# Patient Record
Sex: FEMALE | Race: WHITE
[De-identification: names, ages, dates, MRNs, and addresses within clinical notes are randomized per-mention and may not be internally consistent; named-entity substitution may affect disease eponyms.]

---

## 2017-02-16 ENCOUNTER — HOSPITAL ENCOUNTER (EMERGENCY)
Age: 52
Discharge: HOME | End: 2017-02-16
Payer: COMMERCIAL

## 2017-02-16 DIAGNOSIS — Z79.82: ICD-10-CM

## 2017-02-16 DIAGNOSIS — I87.8: ICD-10-CM

## 2017-02-16 DIAGNOSIS — E11.65: ICD-10-CM

## 2017-02-16 DIAGNOSIS — Z79.4: ICD-10-CM

## 2017-02-16 DIAGNOSIS — F17.200: ICD-10-CM

## 2017-02-16 DIAGNOSIS — I11.0: Primary | ICD-10-CM

## 2017-02-16 DIAGNOSIS — J45.909: ICD-10-CM

## 2017-02-16 DIAGNOSIS — I89.0: ICD-10-CM

## 2017-02-16 DIAGNOSIS — I50.9: ICD-10-CM

## 2017-02-16 DIAGNOSIS — E11.42: ICD-10-CM

## 2017-02-16 PROCEDURE — 83735 ASSAY OF MAGNESIUM: CPT

## 2017-02-16 PROCEDURE — 96374 THER/PROPH/DIAG INJ IV PUSH: CPT

## 2017-02-16 PROCEDURE — 99283 EMERGENCY DEPT VISIT LOW MDM: CPT

## 2017-02-16 PROCEDURE — 85025 COMPLETE CBC W/AUTO DIFF WBC: CPT

## 2017-02-16 PROCEDURE — 96375 TX/PRO/DX INJ NEW DRUG ADDON: CPT

## 2017-02-16 PROCEDURE — 83880 ASSAY OF NATRIURETIC PEPTIDE: CPT

## 2017-02-16 PROCEDURE — 96376 TX/PRO/DX INJ SAME DRUG ADON: CPT

## 2017-02-16 PROCEDURE — 99284 EMERGENCY DEPT VISIT MOD MDM: CPT

## 2017-02-16 PROCEDURE — 93005 ELECTROCARDIOGRAM TRACING: CPT

## 2017-02-16 PROCEDURE — 36415 COLL VENOUS BLD VENIPUNCTURE: CPT

## 2017-02-16 PROCEDURE — 80053 COMPREHEN METABOLIC PANEL: CPT

## 2017-02-16 PROCEDURE — 71020: CPT

## 2017-02-16 PROCEDURE — 83690 ASSAY OF LIPASE: CPT

## 2017-04-10 ENCOUNTER — HOSPITAL ENCOUNTER (OUTPATIENT)
Age: 52
End: 2017-04-10
Payer: COMMERCIAL

## 2017-04-10 DIAGNOSIS — Z03.89: Primary | ICD-10-CM

## 2017-04-11 ENCOUNTER — HOSPITAL ENCOUNTER (EMERGENCY)
Age: 52
Discharge: ANOTHER HOSPITAL | End: 2017-04-11
Payer: COMMERCIAL

## 2017-04-11 ENCOUNTER — HOSPITAL ENCOUNTER (OUTPATIENT)
Age: 52
Discharge: ANOTHER HOSPITAL | End: 2017-04-11
Payer: COMMERCIAL

## 2017-04-11 ENCOUNTER — HOSPITAL ENCOUNTER (OUTPATIENT)
Age: 52
Discharge: ANOTHER HOSPITAL | End: 2017-04-11
Payer: COMMERCIAL

## 2017-04-11 DIAGNOSIS — J45.909: ICD-10-CM

## 2017-04-11 DIAGNOSIS — Z79.4: ICD-10-CM

## 2017-04-11 DIAGNOSIS — M72.6: Primary | ICD-10-CM

## 2017-04-11 DIAGNOSIS — E11.42: ICD-10-CM

## 2017-04-11 DIAGNOSIS — E78.00: ICD-10-CM

## 2017-04-11 DIAGNOSIS — L98.9: Primary | ICD-10-CM

## 2017-04-11 DIAGNOSIS — I10: ICD-10-CM

## 2017-04-11 DIAGNOSIS — F17.200: ICD-10-CM

## 2017-04-11 DIAGNOSIS — K21.9: ICD-10-CM

## 2017-04-11 PROCEDURE — 83605 ASSAY OF LACTIC ACID: CPT

## 2017-04-11 PROCEDURE — 80053 COMPREHEN METABOLIC PANEL: CPT

## 2017-04-11 PROCEDURE — 87040 BLOOD CULTURE FOR BACTERIA: CPT

## 2017-04-11 PROCEDURE — 83690 ASSAY OF LIPASE: CPT

## 2017-04-11 PROCEDURE — 99291 CRITICAL CARE FIRST HOUR: CPT

## 2017-04-11 PROCEDURE — 36415 COLL VENOUS BLD VENIPUNCTURE: CPT

## 2017-04-11 PROCEDURE — 85025 COMPLETE CBC W/AUTO DIFF WBC: CPT

## 2017-04-11 PROCEDURE — 96365 THER/PROPH/DIAG IV INF INIT: CPT

## 2017-04-11 PROCEDURE — 99284 EMERGENCY DEPT VISIT MOD MDM: CPT

## 2017-04-11 PROCEDURE — 72193 CT PELVIS W/DYE: CPT

## 2017-05-26 ENCOUNTER — HOSPITAL ENCOUNTER (OUTPATIENT)
Dept: HOSPITAL 76 - EMS | Age: 52
Discharge: ANOTHER HOSPITAL | End: 2017-05-26
Attending: SURGERY
Payer: COMMERCIAL

## 2017-05-26 ENCOUNTER — HOSPITAL ENCOUNTER (OUTPATIENT)
Dept: HOSPITAL 76 - EMS | Age: 52
Discharge: HOME | End: 2017-05-26
Attending: SURGERY
Payer: COMMERCIAL

## 2017-05-26 ENCOUNTER — HOSPITAL ENCOUNTER (EMERGENCY)
Dept: HOSPITAL 76 - ED | Age: 52
Discharge: HOME | End: 2017-05-26
Payer: COMMERCIAL

## 2017-05-26 VITALS — DIASTOLIC BLOOD PRESSURE: 84 MMHG | SYSTOLIC BLOOD PRESSURE: 167 MMHG

## 2017-05-26 DIAGNOSIS — E66.01: ICD-10-CM

## 2017-05-26 DIAGNOSIS — M79.605: Primary | ICD-10-CM

## 2017-05-26 DIAGNOSIS — Z79.82: ICD-10-CM

## 2017-05-26 DIAGNOSIS — Z98.890: ICD-10-CM

## 2017-05-26 DIAGNOSIS — Z91.19: ICD-10-CM

## 2017-05-26 DIAGNOSIS — R53.1: Primary | ICD-10-CM

## 2017-05-26 DIAGNOSIS — E11.42: ICD-10-CM

## 2017-05-26 DIAGNOSIS — M72.6: ICD-10-CM

## 2017-05-26 DIAGNOSIS — F17.200: ICD-10-CM

## 2017-05-26 DIAGNOSIS — I10: ICD-10-CM

## 2017-05-26 DIAGNOSIS — Z79.4: ICD-10-CM

## 2017-05-26 DIAGNOSIS — M79.89: ICD-10-CM

## 2017-05-26 LAB
ALBUMIN/GLOB SERPL: 0.5 {RATIO} (ref 1–2.2)
ANION GAP SERPL CALCULATED.4IONS-SCNC: 7 MMOL/L (ref 6–13)
BASOPHILS NFR BLD AUTO: 0.2 10^3/UL (ref 0–0.1)
BASOPHILS NFR BLD AUTO: 2.6 %
BILIRUB BLD-MCNC: 0.6 MG/DL (ref 0.2–1)
BUN SERPL-MCNC: 19 MG/DL (ref 6–20)
CALCIUM UR-MCNC: 8.8 MG/DL (ref 8.5–10.3)
CHLORIDE SERPL-SCNC: 102 MMOL/L (ref 101–111)
CO2 SERPL-SCNC: 30 MMOL/L (ref 21–32)
CREAT SERPLBLD-SCNC: 0.8 MG/DL (ref 0.4–1)
CUL URINE ADD CHARGE: (no result)
EOSINOPHIL # BLD AUTO: 0.3 10^3/UL (ref 0–0.7)
EOSINOPHIL NFR BLD AUTO: 5 %
ERYTHROCYTE [DISTWIDTH] IN BLOOD BY AUTOMATED COUNT: 17.7 % (ref 12–15)
GFRSERPLBLD MDRD-ARVRAT: 76 ML/MIN/{1.73_M2} (ref 89–?)
GLOBULIN SER-MCNC: 4 G/DL (ref 2.1–4.2)
GLUCOSE SERPL-MCNC: 168 MG/DL (ref 70–100)
HCT VFR BLD AUTO: 28.1 % (ref 37–47)
HGB UR QL STRIP: 9 G/DL (ref 12–16)
LIPASE SERPL-CCNC: 19 U/L (ref 22–51)
LYMPHOCYTES # SPEC AUTO: 1.4 10^3/UL (ref 1.5–3.5)
LYMPHOCYTES NFR BLD AUTO: 20.7 %
MCH RBC QN AUTO: 22.9 PG (ref 27–31)
MCHC RBC AUTO-ENTMCNC: 31.9 G/DL (ref 32–36)
MCV RBC AUTO: 71.7 FL (ref 81–99)
MONOCYTES # BLD AUTO: 0.6 10^3/UL (ref 0–1)
MONOCYTES NFR BLD AUTO: 9.2 %
NEUTROPHILS # BLD AUTO: 4.3 10^3/UL (ref 1.5–6.6)
NEUTROPHILS # SNV AUTO: 6.9 X10^3/UL (ref 4.8–10.8)
NEUTROPHILS NFR BLD AUTO: 62.5 %
NRBC # BLD AUTO: 0 /100WBC
PDW BLD AUTO: 8.7 FL (ref 7.9–10.8)
PH UR STRIP.AUTO: 7 PH (ref 5–7.5)
POTASSIUM SERPL-SCNC: 3.6 MMOL/L (ref 3.5–5)
PROT SPEC-MCNC: 6 G/DL (ref 6.7–8.2)
RBC MAR: 3.92 10^6/UL (ref 4.2–5.4)
SODIUM SERPLBLD-SCNC: 139 MMOL/L (ref 135–145)
SP GR UR STRIP.AUTO: 1.02 (ref 1–1.03)
UA CHARGE (STRIP ONLY): (no result)
UA W/ MICROSCOPIC CHARGE: YES
UR CULTURE IF IND: (no result)
UROBILINOGEN UR STRIP.AUTO-MCNC: NEGATIVE MG/DL
WBC # BLD: 6.9 X10^3/UL
WBC # UR MANUAL: (no result) /HPF (ref 0–5)

## 2017-05-26 PROCEDURE — 85025 COMPLETE CBC W/AUTO DIFF WBC: CPT

## 2017-05-26 PROCEDURE — 99284 EMERGENCY DEPT VISIT MOD MDM: CPT

## 2017-05-26 PROCEDURE — 83690 ASSAY OF LIPASE: CPT

## 2017-05-26 PROCEDURE — 80053 COMPREHEN METABOLIC PANEL: CPT

## 2017-05-26 PROCEDURE — 87086 URINE CULTURE/COLONY COUNT: CPT

## 2017-05-26 PROCEDURE — 81003 URINALYSIS AUTO W/O SCOPE: CPT

## 2017-05-26 PROCEDURE — 81001 URINALYSIS AUTO W/SCOPE: CPT

## 2017-05-26 PROCEDURE — 36415 COLL VENOUS BLD VENIPUNCTURE: CPT

## 2017-05-26 NOTE — ED PHYSICIAN DOCUMENTATION
History of Present Illness





- Stated complaint


Stated Complaint: WEAKNESS, POST OP SURGICAL PAIN





- Chief complaint


Chief Complaint: Wound





- History obtained from


History obtained from: Patient, Family, EMS





- History of Present Illness


Timing: How many weeks ago (6)





- Additonal information


Additional information: 





51 y/o morbidly obese female with a history of type 2 diabetes had nec fasc in 

her right groin on 4-11-17, was transferred to [facility] had debridement done and has 

been recovering at [facility] until she left AMA 2 days ago. She has been confined for 

the past 6 weeks, she has lost the ability to walk and was undergoing physical 

therapy when she became frustrated specifically with the physical therapist and 

left the hospital AMA. She was assisted to the car by passers by and she was 

able to get out of the car at home and made it to the front porch where her 

 was able to get a sleeping bag under her and drag her into the house. 

She has been on the floor on a memory foam since and she has not been able to 

get up. She has weakness but no fever and her wound is continuing to heal by 

secondary intention and her  is able to dress the wounds. Her diabetes 

has been controlled in the hospital and is much improved from her previous with 

A1C of 14. Today her blood sugar is 155 on arrival.  





Review of Systems


Constitutional: denies: Fever


Eyes: denies: Decreased vision


Ears: denies: Ear pain


Nose: denies: Congestion


Throat: denies: Oral lesions / sores


Cardiac: denies: Chest pain / pressure, Palpitations


Respiratory: denies: Dyspnea, Cough


GI: denies: Abdominal Pain, Nausea, Vomiting


: denies: Dysuria, Frequency


Skin: reports: Other (There is a deep wound healing on the right groin.)


Musculoskeletal: reports: Extremity pain


Neurologic: reports: Generalized weakness.  denies: Focal weakness, Numbness





PD PAST MEDICAL HISTORY





- Past Medical History


Cardiovascular: Hypertension, High cholesterol


Respiratory: Asthma


Neuro: Headache/migraine, Peripheral neuropathy


Endocrine/Autoimmune: Type 2 diabetes


GI: GERD


GYN: None


: None


HEENT: None


Psych: None, Anxiety


Musculoskeletal: None


Derm: None





- Past Surgical History


Past Surgical History: Yes


General: Appendectomy





- Present Medications


Home Medications: 


 Ambulatory Orders











 Medication  Instructions  Recorded  Confirmed


 


Esomeprazole Magnesium [Nexium] 40 mg PO BID 03/12/15 04/11/17


 


Insulin Aspart (Vial) [NovoLOG] 10 units SQ TIDACHS 03/12/15 04/11/17


 


Insulin Glargine,Hum.rec.anlog 50 units SQ BID 03/12/15 04/11/17





[Lantus]   


 


Lisinopril 10 mg PO DAILY 03/12/15 04/11/17


 


Simvastatin 20 mg PO DAILY 03/12/15 04/11/17


 


Aspirin [Aspir 81] 81 mg PO DAILY 12/31/15 04/11/17


 


Metoprolol Succinate [Toprol Xl] 25 mg PO DAILY 12/31/15 04/11/17


 


Potassium Chloride [Klor-Con 10] 20 meq PO DAILY 01/14/16 04/11/17


 


Chlorhexidine Gluconate [Hibiclens] 1 applic TP DAILY #238 ml 02/16/17 04/11/17


 


Fluticasone/Salmeterol 250/50 1 puffs INH BID #1 inhaler 02/16/17 04/11/17





[Advair 250 Mcg/50 Mcg]   


 


Ipratropium/Albuterol [Combivent 1 puffs IH BID #1 aerosol 02/16/17 04/11/17





Respimat]   


 


Nortriptyline HCl 75 mg PO DAILY #30 capsule 02/16/17 04/11/17


 


Urea [Gormel Ten] 1 applic TP BID #228 g 02/16/17 04/11/17


 


Furosemide [Lasix] 60 mg PO DAILY 04/11/17 04/11/17














- Allergies


Allergies/Adverse Reactions: 


 Allergies











Allergy/AdvReac Type Severity Reaction Status Date / Time


 


metformin AdvReac  Nausea Verified 05/26/17 06:35


 


oxycodone HCl * AdvReac  Unknown Verified 05/26/17 06:35





[From Percocet]     














- Social History


Does the pt smoke?: Yes


Smoking Status: Current every day smoker


Does the pt drink ETOH?: Yes


Does the pt have substance abuse?: No





- Immunizations


Immunizations are current?: Yes





- POLST


Patient has POLST: No





PD ED PE NORMAL





- Vitals


Vital signs reviewed: Yes (hypertension )





- General


General: Alert and oriented X 3, No acute distress, Well developed/nourished





- HEENT


HEENT: Atraumatic, PERRL, EOMI





- Neck


Neck: Supple, no meningeal sign





- Cardiac


Cardiac: RRR, No murmur





- Respiratory


Respiratory: No respiratory distress, Clear bilaterally





- Abdomen


Abdomen: Other (morbidly obese without tenderness)





- Back


Back: No CVA TTP, No spinal TTP





- Derm


Derm: Normal color, Warm and dry





- Extremities


Extremities: Other (There is pitting edema to the LE bilaterally with some 

superficial erythema to the knee. There is an area about 30X 30 in the right 

groin that is healing by secondary intention. There is surface drainage from 

the entire surface that is red, no specific smell. The  notes that the 

wound looks similar to prior. )





- Neuro


Neuro: No motor deficit, No sensory deficit





- Psych


Psych: Other (mood is helpless and the affect is flat. )





Results





- Vitals


Vitals: 


 Vital Signs - 24 hr











  05/26/17 05/26/17 05/26/17





  06:25 06:56 10:45


 


Temperature 36.8 C  36.7 C


 


Heart Rate 97 103 H 111 H


 


Respiratory 18 18 18





Rate   


 


Blood Pressure 191/101 H 130/80 162/89 H


 


O2 Saturation 98 99 100














  05/26/17





  13:31


 


Temperature 


 


Heart Rate 90


 


Respiratory 19





Rate 


 


Blood Pressure 167/84 H


 


O2 Saturation 94








 Oxygen











O2 Source                      Room air

















- Labs


Labs: 


 Laboratory Tests











  05/26/17 05/26/17 05/26/17





  08:29 08:29 11:35


 


WBC  6.9  


 


RBC  3.92 L  


 


Hgb  9.0 L  


 


Hct  28.1 L  


 


MCV  71.7 L  


 


MCH  22.9 L  


 


MCHC  31.9 L  


 


RDW  17.7 H  


 


Plt Count  290  


 


MPV  8.7  


 


Neut #  4.3  


 


Lymph #  1.4 L  


 


Mono #  0.6  


 


Eos #  0.3  


 


Baso #  0.2 H  


 


Absolute Nucleated RBC  0.00  


 


Nucleated RBCs  0.0  


 


Sodium   139 


 


Potassium   3.6 


 


Chloride   102 


 


Carbon Dioxide   30 


 


Anion Gap   7.0 


 


BUN   19 


 


Creatinine   0.8 


 


Estimated GFR (MDRD)   76 L 


 


Glucose   168 H 


 


Calcium   8.8 


 


Total Bilirubin   0.6 


 


AST   14 


 


ALT   14 


 


Alkaline Phosphatase   83 


 


Total Protein   6.0 L 


 


Albumin   2.0 L 


 


Globulin   4.0 


 


Albumin/Globulin Ratio   0.5 L 


 


Lipase   19 L 


 


Urine Color    YELLOW


 


Urine Clarity    CLEAR


 


Urine pH    7.0


 


Ur Specific Gravity    1.020


 


Urine Protein    >=300 H


 


Urine Glucose (UA)    100 H


 


Urine Ketones    NEGATIVE


 


Urine Occult Blood    SMALL H


 


Urine Nitrite    NEGATIVE


 


Urine Bilirubin    NEGATIVE


 


Urine Urobilinogen    0.2 (NORMAL)


 


Ur Leukocyte Esterase    NEGATIVE


 


Urine RBC    0-5


 


Urine WBC    0-3


 


Ur Squamous Epith Cells    MOD Squamous H


 


Urine Bacteria    None Seen


 


Ur Microscopic Review    INDICATED


 


Urine Culture Comments    NOT INDICATED














PD MEDICAL DECISION MAKING





- ED course


Complexity details: reviewed old records, reviewed results, re-evaluated patient

, considered differential, d/w patient, d/w family


ED course: 





51 y/o morbidly obese female has survived necrotizing fasciitis and has severe 

deconditioning and is not able to walk and care for herself as yet. She has 

ongoing needs for wound care which the  has been providing and she has 

needs for assistance with ambulation. She has left another hospital AMA and my 

initial impression is that she would need extended rehab to regain her ability 

to walk. We were not able to identify a facility for this bariatric patient and 

she was invested in returning to her home. Social serviced helped with some 

arrangements and she was transferred back to her home by ambulance with the 

fire department called to help. The family will try to obtain the needed 

medical bed and a lift and the patient's diabetes is under much improved 

control. 





Departure





- Departure


Disposition: 01 Home, Self Care


Clinical Impression: 


 Healing wound, Severe muscle deconditioning


Condition: Stable


Instructions:  ED Packing Removal Replacement


Follow-Up: 


Lindsay Sosa PA-C [Primary Care Provider] - 


Discharge Date/Time: 05/26/17 14:32

## 2017-05-31 ENCOUNTER — HOSPITAL ENCOUNTER (OUTPATIENT)
Dept: HOSPITAL 76 - EMS | Age: 52
Discharge: ANOTHER HOSPITAL | End: 2017-05-31
Attending: SURGERY
Payer: COMMERCIAL

## 2017-05-31 ENCOUNTER — HOSPITAL ENCOUNTER (INPATIENT)
Age: 52
LOS: 14 days | Discharge: NURSING, REHAB OR LONG-TERM CARE | DRG: 555 | End: 2017-06-14
Payer: COMMERCIAL

## 2017-05-31 DIAGNOSIS — L03.314: ICD-10-CM

## 2017-05-31 DIAGNOSIS — E11.65: ICD-10-CM

## 2017-05-31 DIAGNOSIS — B95.2: ICD-10-CM

## 2017-05-31 DIAGNOSIS — Z79.82: ICD-10-CM

## 2017-05-31 DIAGNOSIS — E78.5: ICD-10-CM

## 2017-05-31 DIAGNOSIS — B95.8: ICD-10-CM

## 2017-05-31 DIAGNOSIS — E11.42: ICD-10-CM

## 2017-05-31 DIAGNOSIS — M72.6: ICD-10-CM

## 2017-05-31 DIAGNOSIS — T14.8: Primary | ICD-10-CM

## 2017-05-31 DIAGNOSIS — K21.9: ICD-10-CM

## 2017-05-31 DIAGNOSIS — R26.2: Primary | ICD-10-CM

## 2017-05-31 DIAGNOSIS — Z98.890: ICD-10-CM

## 2017-05-31 DIAGNOSIS — F17.210: ICD-10-CM

## 2017-05-31 DIAGNOSIS — Z91.11: ICD-10-CM

## 2017-05-31 DIAGNOSIS — I10: ICD-10-CM

## 2017-05-31 DIAGNOSIS — R53.81: ICD-10-CM

## 2017-05-31 DIAGNOSIS — E66.01: ICD-10-CM

## 2017-05-31 DIAGNOSIS — G89.29: ICD-10-CM

## 2017-05-31 DIAGNOSIS — J45.909: ICD-10-CM

## 2017-05-31 DIAGNOSIS — Z74.01: ICD-10-CM
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
## 2017-05-31 DIAGNOSIS — Z60.2: ICD-10-CM

## 2017-05-31 DIAGNOSIS — F41.9: ICD-10-CM

## 2017-05-31 DIAGNOSIS — Z91.19: ICD-10-CM

## 2017-05-31 DIAGNOSIS — Z79.4: ICD-10-CM

## 2017-05-31 SDOH — SOCIAL STABILITY - SOCIAL INSECURITY: PROBLEMS RELATED TO LIVING ALONE: Z60.2

## 2017-06-14 ENCOUNTER — HOSPITAL ENCOUNTER (OUTPATIENT)
Dept: HOSPITAL 76 - EMS | Age: 52
Discharge: NURSING, REHAB OR LONG-TERM CARE | End: 2017-06-14
Attending: SURGERY
Payer: COMMERCIAL

## 2017-06-14 DIAGNOSIS — E66.01: Primary | ICD-10-CM

## 2017-06-24 ENCOUNTER — HOSPITAL ENCOUNTER (OUTPATIENT)
Dept: HOSPITAL 76 - EMS | Age: 52
End: 2017-06-24
Attending: SURGERY
Payer: COMMERCIAL

## 2017-06-24 DIAGNOSIS — R52: Primary | ICD-10-CM

## 2017-06-26 ENCOUNTER — HOSPITAL ENCOUNTER (OUTPATIENT)
Dept: HOSPITAL 76 - EMS | Age: 52
End: 2017-06-26
Attending: SURGERY
Payer: COMMERCIAL

## 2017-06-26 DIAGNOSIS — R53.1: Primary | ICD-10-CM

## 2017-07-05 ENCOUNTER — HOSPITAL ENCOUNTER (OUTPATIENT)
Age: 52
End: 2017-07-05
Payer: COMMERCIAL
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
## 2017-07-05 DIAGNOSIS — Z03.89: Primary | ICD-10-CM

## 2017-08-08 ENCOUNTER — HOSPITAL ENCOUNTER (OUTPATIENT)
Dept: HOSPITAL 76 - LAB.R | Age: 52
End: 2017-08-08
Attending: PHYSICIAN ASSISTANT
Payer: COMMERCIAL

## 2017-08-08 DIAGNOSIS — L03.115: Primary | ICD-10-CM

## 2017-08-08 PROCEDURE — 87070 CULTURE OTHR SPECIMN AEROBIC: CPT

## 2017-08-08 PROCEDURE — 87205 SMEAR GRAM STAIN: CPT

## 2018-01-01 ENCOUNTER — HOSPITAL ENCOUNTER (OUTPATIENT)
Dept: HOSPITAL 76 - EMS | Age: 53
Discharge: ANOTHER HOSPITAL | End: 2018-01-01
Attending: SURGERY
Payer: COMMERCIAL

## 2018-01-01 ENCOUNTER — HOSPITAL ENCOUNTER (INPATIENT)
Dept: HOSPITAL 76 - ED | Age: 53
LOS: 4 days | Discharge: ANOTHER HOSPITAL | DRG: 314 | End: 2018-01-05
Attending: INTERNAL MEDICINE | Admitting: INTERNAL MEDICINE
Payer: COMMERCIAL

## 2018-01-01 DIAGNOSIS — E66.2: ICD-10-CM

## 2018-01-01 DIAGNOSIS — F41.9: ICD-10-CM

## 2018-01-01 DIAGNOSIS — E78.5: ICD-10-CM

## 2018-01-01 DIAGNOSIS — Z79.82: ICD-10-CM

## 2018-01-01 DIAGNOSIS — Z91.19: ICD-10-CM

## 2018-01-01 DIAGNOSIS — G89.29: ICD-10-CM

## 2018-01-01 DIAGNOSIS — E11.42: ICD-10-CM

## 2018-01-01 DIAGNOSIS — I27.23: Primary | ICD-10-CM

## 2018-01-01 DIAGNOSIS — R35.1: ICD-10-CM

## 2018-01-01 DIAGNOSIS — R30.0: ICD-10-CM

## 2018-01-01 DIAGNOSIS — I13.0: ICD-10-CM

## 2018-01-01 DIAGNOSIS — Z79.51: ICD-10-CM

## 2018-01-01 DIAGNOSIS — R35.0: ICD-10-CM

## 2018-01-01 DIAGNOSIS — N18.9: ICD-10-CM

## 2018-01-01 DIAGNOSIS — R06.00: Primary | ICD-10-CM

## 2018-01-01 DIAGNOSIS — F17.200: ICD-10-CM

## 2018-01-01 DIAGNOSIS — R39.15: ICD-10-CM

## 2018-01-01 DIAGNOSIS — E11.649: ICD-10-CM

## 2018-01-01 DIAGNOSIS — J80: ICD-10-CM

## 2018-01-01 DIAGNOSIS — E11.22: ICD-10-CM

## 2018-01-01 DIAGNOSIS — N17.9: ICD-10-CM

## 2018-01-01 DIAGNOSIS — R32: ICD-10-CM

## 2018-01-01 DIAGNOSIS — Z79.899: ICD-10-CM

## 2018-01-01 DIAGNOSIS — I26.99: ICD-10-CM

## 2018-01-01 DIAGNOSIS — K21.9: ICD-10-CM

## 2018-01-01 DIAGNOSIS — G43.909: ICD-10-CM

## 2018-01-01 DIAGNOSIS — J44.9: ICD-10-CM

## 2018-01-01 DIAGNOSIS — Z86.19: ICD-10-CM

## 2018-01-01 DIAGNOSIS — G47.33: ICD-10-CM

## 2018-01-01 DIAGNOSIS — J18.1: ICD-10-CM

## 2018-01-01 DIAGNOSIS — E43: ICD-10-CM

## 2018-01-01 DIAGNOSIS — M54.9: ICD-10-CM

## 2018-01-01 DIAGNOSIS — I50.30: ICD-10-CM

## 2018-01-01 LAB
ANION GAP SERPL CALCULATED.4IONS-SCNC: 5 MMOL/L (ref 6–13)
BASOPHILS NFR BLD AUTO: 0.1 10^3/UL (ref 0–0.1)
BASOPHILS NFR BLD AUTO: 1.3 %
BUN SERPL-MCNC: 23 MG/DL (ref 6–20)
CALCIUM UR-MCNC: 8.7 MG/DL (ref 8.5–10.3)
CASTS URNS QL MICRO: (no result) /LPF
CHLORIDE SERPL-SCNC: 107 MMOL/L (ref 101–111)
CLARITY UR REFRACT.AUTO: (no result)
CO2 SERPL-SCNC: 29 MMOL/L (ref 21–32)
CREAT SERPLBLD-SCNC: 1.5 MG/DL (ref 0.4–1)
EOSINOPHIL # BLD AUTO: 0.2 10^3/UL (ref 0–0.7)
EOSINOPHIL NFR BLD AUTO: 3.3 %
ERYTHROCYTE [DISTWIDTH] IN BLOOD BY AUTOMATED COUNT: 19.6 % (ref 12–15)
GFRSERPLBLD MDRD-ARVRAT: 37 ML/MIN/{1.73_M2} (ref 89–?)
GLUCOSE SERPL-MCNC: 203 MG/DL (ref 70–100)
GLUCOSE UR QL STRIP.AUTO: 250 MG/DL
HGB UR QL STRIP: 12.6 G/DL (ref 12–16)
INFLUENZA A & B AG INTERPRET: (no result)
KETONES UR QL STRIP.AUTO: NEGATIVE MG/DL
LYMPHOCYTES # SPEC AUTO: 1.4 10^3/UL (ref 1.5–3.5)
LYMPHOCYTES NFR BLD AUTO: 20.4 %
MCH RBC QN AUTO: 23.4 PG (ref 27–31)
MCHC RBC AUTO-ENTMCNC: 30.6 G/DL (ref 32–36)
MCV RBC AUTO: 76.4 FL (ref 81–99)
MONOCYTES # BLD AUTO: 0.5 10^3/UL (ref 0–1)
MONOCYTES NFR BLD AUTO: 7.1 %
NEUTROPHILS # BLD AUTO: 4.7 10^3/UL (ref 1.5–6.6)
NEUTROPHILS # SNV AUTO: 6.9 X10^3/UL (ref 4.8–10.8)
NEUTROPHILS NFR BLD AUTO: 67.9 %
NITRITE UR QL STRIP.AUTO: NEGATIVE
PDW BLD AUTO: 10 FL (ref 7.9–10.8)
PH UR STRIP.AUTO: 6 PH (ref 5–7.5)
PLATELET # BLD: 148 10^3/UL (ref 130–450)
PROT UR STRIP.AUTO-MCNC: >=300 MG/DL
RBC # UR STRIP.AUTO: (no result) /UL
RBC # URNS HPF: (no result) /HPF (ref 0–5)
RBC MAR: 5.39 10^6/UL (ref 4.2–5.4)
SODIUM SERPLBLD-SCNC: 141 MMOL/L (ref 135–145)
SP GR UR STRIP.AUTO: 1.02 (ref 1–1.03)
SQUAMOUS URNS QL MICRO: (no result)
UROBILINOGEN UR QL STRIP.AUTO: (no result) E.U./DL
UROBILINOGEN UR STRIP.AUTO-MCNC: NEGATIVE MG/DL

## 2018-01-01 PROCEDURE — 93005 ELECTROCARDIOGRAM TRACING: CPT

## 2018-01-01 PROCEDURE — 99284 EMERGENCY DEPT VISIT MOD MDM: CPT

## 2018-01-01 PROCEDURE — 94640 AIRWAY INHALATION TREATMENT: CPT

## 2018-01-01 PROCEDURE — 83735 ASSAY OF MAGNESIUM: CPT

## 2018-01-01 PROCEDURE — 81001 URINALYSIS AUTO W/SCOPE: CPT

## 2018-01-01 PROCEDURE — 82803 BLOOD GASES ANY COMBINATION: CPT

## 2018-01-01 PROCEDURE — 94770: CPT

## 2018-01-01 PROCEDURE — 87275 INFLUENZA B AG IF: CPT

## 2018-01-01 PROCEDURE — 84484 ASSAY OF TROPONIN QUANT: CPT

## 2018-01-01 PROCEDURE — 36600 WITHDRAWAL OF ARTERIAL BLOOD: CPT

## 2018-01-01 PROCEDURE — 71045 X-RAY EXAM CHEST 1 VIEW: CPT

## 2018-01-01 PROCEDURE — 84100 ASSAY OF PHOSPHORUS: CPT

## 2018-01-01 PROCEDURE — 87086 URINE CULTURE/COLONY COUNT: CPT

## 2018-01-01 PROCEDURE — 93306 TTE W/DOPPLER COMPLETE: CPT

## 2018-01-01 PROCEDURE — 81003 URINALYSIS AUTO W/O SCOPE: CPT

## 2018-01-01 PROCEDURE — 96374 THER/PROPH/DIAG INJ IV PUSH: CPT

## 2018-01-01 PROCEDURE — 83605 ASSAY OF LACTIC ACID: CPT

## 2018-01-01 PROCEDURE — 83036 HEMOGLOBIN GLYCOSYLATED A1C: CPT

## 2018-01-01 PROCEDURE — 96372 THER/PROPH/DIAG INJ SC/IM: CPT

## 2018-01-01 PROCEDURE — 51702 INSERT TEMP BLADDER CATH: CPT

## 2018-01-01 PROCEDURE — 87040 BLOOD CULTURE FOR BACTERIA: CPT

## 2018-01-01 PROCEDURE — 83880 ASSAY OF NATRIURETIC PEPTIDE: CPT

## 2018-01-01 PROCEDURE — 36415 COLL VENOUS BLD VENIPUNCTURE: CPT

## 2018-01-01 PROCEDURE — 94660 CPAP INITIATION&MGMT: CPT

## 2018-01-01 PROCEDURE — 85025 COMPLETE CBC W/AUTO DIFF WBC: CPT

## 2018-01-01 PROCEDURE — 80053 COMPREHEN METABOLIC PANEL: CPT

## 2018-01-01 PROCEDURE — 94002 VENT MGMT INPAT INIT DAY: CPT

## 2018-01-01 PROCEDURE — 87276 INFLUENZA A AG IF: CPT

## 2018-01-01 PROCEDURE — 80048 BASIC METABOLIC PNL TOTAL CA: CPT

## 2018-01-01 RX ADMIN — PREGABALIN SCH MG: 100 CAPSULE ORAL at 21:33

## 2018-01-01 RX ADMIN — FUROSEMIDE SCH MG: 10 INJECTION, SOLUTION INTRAVENOUS at 21:32

## 2018-01-01 RX ADMIN — SODIUM CHLORIDE, PRESERVATIVE FREE SCH ML: 5 INJECTION INTRAVENOUS at 21:34

## 2018-01-01 RX ADMIN — NORTRIPTYLINE HYDROCHLORIDE SCH MG: 25 CAPSULE ORAL at 21:32

## 2018-01-01 RX ADMIN — ENOXAPARIN SODIUM SCH: 100 INJECTION SUBCUTANEOUS at 22:14

## 2018-01-01 NOTE — HISTORY & PHYSICAL EXAMINATION
Chief Complaint





- Chief Complaint


Chief Complaint: cough





Respiratory Admission HPI





- Admitted From


Admitted from: ED





- History Obtained From


Records Reviewed: RN notes reviewed, Old records reviewed


History obtained from: Patient, Family


Exam limitations: Clinical condition (unable to turn patient, but bariatric bed 

ordered to accommodate patient body habitous.)





- History of Present Illness


Location Problem/Description: Anasarca


Severity at the worst: reports: Severe


Context of Onset: reports: Exertion, Movement, Position


Timing: reports: Gradual onset


Improved with: reports: Nothing


Worsened by: reports: Exertion, Movement


Associated symptoms: reports: General weakness, Other


HPI Comment/Other: 


Fanny Flores is a morbidly obese 52-year old white female with a history of 

super obesity with a current BMI of 76.7, diabetes mellitus type 2, necrotizing 

fascitis to inner thigh, hypertension, hyperlipidemia, migraines, peripheral 

neuropathy, GERD, anxiety, chronic back pain, cardiac stress test, and CHF. The 

patient came to the ED with progressive shortness of breath, increased edema, 

headaches and weakness.  She admits to having bronchitis about a week ago  Once 

arriving in the ED the patient was found to be very hypoxic with oxygen 

saturations in the 80's.  Patient will be admitted for aggressive diuresis, 

dugan catheter for skin protection, blood sugar control and further work up.





PMH/PSH





- Past Medical History


Cardiovascular: positive: Hypertension, High cholesterol, Angina (history of a 

cardiac stress test.)


Respiratory: positive: Asthma, Shortness of breath


Neuro: positive: Headache/migraine, Peripheral neuropathy


Endocrine/Autoimmune: positive: Type 2 diabetes


GI: positive: GERD


GYN: positive: None


: positive: None, Incontinence, Frequency


HEENT: positive: None


Psych: positive: Anxiety


Musculoskeletal: positive: Chronic back pain


Derm: positive: None


MRSA Hx?: Yes





- Past Surgical History


General: positive: Appendectomy


Cardiovascular: positive: Angioplasty





Social & Family Hx





- Living Situation


Living Arrangement: At home


Living Situation: With spouse/s.o., With family (A zay and her 6-year old 

child now lives in her home.)





- Social History


Does the pt smoke?: Yes


Smoking Status: Current every day smoker (Has cut back to a few per day.)


Does the pt drink ETOH?: Yes


Does the pt have substance abuse?: No


Additional Social History: Patient lives independently with , zay, and 

zay's son.  [place] 

 is retired from the [employer].  She has a cat named Rita De Dios.





- POLST


Patient has POLST: No


POLST Status: Full Code





- Family History


Family History: Mother: , Diabetes, Type 2, Mental Illness, Father: 

, Diabetes, Type 2, Sister: Alive and Well, , Diabetes, Type 2





Meds/Allgy





- Home Medications


Home Medications: 


 Ambulatory Orders











 Medication  Instructions  Recorded  Confirmed


 


Fluticasone/Salmeterol [Advair 1 puffs INH BID 17





250-50 Diskus]   


 


Polyethylene Glycol 3350 17 gm PO DAILY PRN 17


 


Simvastatin 20 mg PO QPM 17


 


Aspirin [Aspir-Low] 81 mg PO DAILY 17


 


Esomeprazole Magnesium [Nexium] 40 mg PO BIDAC 17


 


Furosemide [Furosemide] 80 mg PO DAILY 17


 


Insulin Aspart [NovoLOG] 35 units SUBQ AC 17


 


Insulin Glargine [Lantus Solostar] 25 units SUBQ QPM 17


 


Ipratropium/Albuterol [Combivent 1 puffs INH QID 17





Respimat]   


 


Levothyroxine Sodium [Synthroid] 50 mcg PO QDAC 17


 


Lisinopril [Lisinopril] 10 mg PO DAILY 17


 


Metoprolol Succinate [Toprol Xl] 25 mg PO DAILY 17


 


Nortriptyline HCl [Nortriptyline 75 mg PO QPM 17





HCl]   


 


Potassium Chloride [K-Tab ER] 30 meq PO DAILYWM 17


 


hydrOXYzine pamoate [Hydroxyzine 25 mg PO QPM PRN 18





Pamoate]   


 


Insulin Aspart [Novolog Flexpen] 8 units SUBQ TIDWM 18














- Allergies


Allergies/Adverse Reactions: 


 Allergies











Allergy/AdvReac Type Severity Reaction Status Date / Time


 


gabapentin AdvReac  Unknown Verified 18 15:40


 


metformin AdvReac  Nausea Verified 18 15:40














Review of Systems





- Constitutional


Constitutional: reports: Fatigue, Weakness, Weight gain (100 lbs over the past 

year.)





- Eyes


Eyes: reports: Corrective lenses





- Ears, Nose & Throat


Ears, Nose & Throat: reports: Nasal congestion, Sore throat, Hoarseness





- Cardiovascular


Cariovascular: reports: Edema





- Respiratory


Respiratory: reports: Cough





- Gastrointestinal


Gastrointestinal: reports: Nausea, Reflux/heartburn





- Genitourinary


Genitourinary: reports: Dysuria, Frequency, Urgency, Incontinence, Nocturia





- Musculoskeletal


Musculoskeletal: reports: Muscle weakness, Joint pain, Joint swelling





- Integumentary


Integumentary: reports: Dryness





- Neurological


Neurological: reports: General weakness, Pre-existing deficit





- All Other Systems


All Other Systems: reports: Reviewed and negative





Exam





- Vital Signs


Reviewed Vital Signs: Yes





- Physical Exam


General Appearance: positive: Alert, Moderate distress, Anxious


Eyes Bilateral: positive: Normal inspection


ENT: positive: ENT inspection nml, Dry mucous membranes


Neck: positive: Nml inspection, Thyroid nml, Stiff neck


Respiratory: positive: Chest non-tender, Wheezes, Rhonchi


Cardiovascular: positive: No gallop, Irregularly irregular, Systolic murmur


Peripheral Pulses: positive: 1+ (doppler pulses BLE)


Abdomen: positive: Tenderness, Guarding, Hepatomegaly, Splenomegaly, Abnml 

bowel sounds


Skin: positive: No rash, Warm, Dry, Pallor, Other (dry, flakey skin)


Extremities: positive: Pedal edema (gross edema throughout.)


Neurologic/Psychiatric: positive: Oriented x3, Weakness, Sensory loss, 

Depressed mood/affect, Other (tearful)


Reflexes: Bicep (R): 1+, Bicep (L): 1+





Results





- Lab Results


Lab results reviewed: Yes


Fish Bones: 


 18 04:45





 18 04:45





- Diagnostic Imaging Results


Diagnostic Imaging Results: positive: Prelim report reviewed, Final report 

reviewed





- EKG Results


EKG Interpreted Independently: Yes





ARRA





- Anticipated LOS


Anticipated Stay Length: 2 or more midnights





- AMI - Statin at Admit


Aspirin Prescribed on Admit: Yes





- Stroke - Rehab Assessment


Rehab services assessment to be ordered?: Yes





- DVT/VTE - Prophylaxis


VTE/DVT Device ordered at admit?: Yes


VTE/DVT Prophylaxis med ordered at admit?: Yes





Impression/Plan





- Problem List


Problem List: 


Type 2 diabetes mellitus without complications- Patient has had diabetes for 

several years. She admits to not checking her blood sugar lately because she 

has been so sick. 


Plan:  Check HgA1C, continue Lantus and SSI with blood sugar checks.  Diabetic 

diet.





Hyperlipidemia, unspecified -Patient has high lipid as a consequence of her DM 

type 2 and morbid obesity.


Plan:  Continue statin therapy and check cholesterol levels.





Essential (primary) hypertension - Patient takes home antihypertensives.


Plan:  Continue medical management and encourage cardiac rehab upon discharge.





Morbid (severe) obesity due to excess calories -Patient has a life-long history 

of this, but lately has gained nearly 100 lbs over the past year.  Likely a 

consequence of CHF.


Plan:  Nutritional consult.  Control diet and blood sugars.





Anasarca-Patient has gross, full body edema that can be appreciated all the way 

up to patient's clavicles.  Patient notes that her bilateral flank areas have 

been very hard to the touch and causing her more discomfort.  


Plan:  Aggressive diuresis with IV lasix and may titrate according to urine 

output or patient clinical findings.





Code status:  FULL





DVT prophylaxis with Lovenox 100 BID.








CMS 96 hour attestation: I believe in good luh based on my medical decision 

making that this patient is reasonably expected to be discharged and/or 

transferred to another hospital within 96 hours.

## 2018-01-01 NOTE — XRAY REPORT
EXAM: 

CHEST RADIOGRAPHY

 

EXAM DATE: 1/1/2018 04:10 PM.

 

CLINICAL HISTORY: Cough hypoxia.

 

COMPARISON: None.

 

TECHNIQUE: 1 view.

 

FINDINGS:

Lungs/Pleura: Detail limited by patient body habitus. There is increased reticular opacity within the
 mid and lower lungs. Small effusions may be present. No pneumothorax.

 

Mediastinum: There is mild cardiomegaly.

 

Other: None.

 

IMPRESSION: 

1. Limited detail secondary to patient body habitus. 

2. There is mild cardiomegaly. 

3. Reticular opacity within the mid and lower lungs could represent edema. 

4. There is no pneumothorax.

 

[facility]

Referring Provider Line: [phone]

 

SITE ID: [id number]

## 2018-01-01 NOTE — ED PHYSICIAN DOCUMENTATION
History of Present Illness





- Stated complaint


Stated Complaint: RESP DISTRESS





- Chief complaint


Chief Complaint: Resp





- Additonal information


Additional information: 





hx from pt


progressive soa and cough and body wide edema over 3-4 weeks


myalgias and HA as well


no fever


morbidly obese, walks a little but mostly bed bound


chroncially swollen legs with neuropathy but no apparent recent change





Review of Systems


Constitutional: reports: Myalgias, Fatigue.  denies: Fever


Cardiac: denies: Chest pain / pressure


Respiratory: reports: Dyspnea, Cough


GI: reports: Other (swollen and skin is gettinged thickend and firm from edema, 

no redness or warmth).  denies: Abdominal Pain


Musculoskeletal: reports: Extremity swelling


Neurologic: reports: Generalized weakness


Endocrine: denies: Easy bruising / bleeding


Immunocompromised: denies: Immunocompromised





PD PAST MEDICAL HISTORY





- Past Medical History


Cardiovascular: Hypertension, High cholesterol


Respiratory: Asthma


Neuro: Headache/migraine, Peripheral neuropathy


Endocrine/Autoimmune: Type 2 diabetes


GI: GERD


GYN: None


: None


HEENT: None


Psych: Anxiety


Musculoskeletal: Chronic back pain


Derm: None





- Past Surgical History


Past Surgical History: Yes


General: Appendectomy


Cardiovascular: Angioplasty





- Present Medications


Home Medications: 


 Ambulatory Orders











 Medication  Instructions  Recorded  Confirmed


 


Fluticasone/Salmeterol [Advair 1 puffs INH BID 06/01/17 01/01/18





250-50 Diskus]   


 


Polyethylene Glycol 3350 17 gm PO DAILY PRN 06/01/17 01/01/18


 


Pregabalin [Lyrica] 300 mg PO BID 06/01/17 01/01/18


 


Simvastatin 20 mg PO QPM 06/01/17 01/01/18


 


Aspirin [Aspir-Low] 81 mg PO DAILY 06/06/17 01/01/18


 


Esomeprazole Magnesium [Nexium] 40 mg PO BIDAC 06/06/17 01/01/18


 


Furosemide [Furosemide] 80 mg PO DAILY 06/06/17 01/01/18


 


Insulin Aspart [NovoLOG] 35 units SUBQ AC 06/06/17 01/01/18


 


Insulin Glargine [Lantus Solostar] 50 units SUBQ BID 06/06/17 01/01/18


 


Ipratropium/Albuterol [Combivent 1 puffs INH QID 06/06/17 01/01/18





Respimat]   


 


Levothyroxine Sodium [Synthroid] 50 mcg PO QDAC 06/06/17 01/01/18


 


Lisinopril [Lisinopril] 10 mg PO DAILY 06/06/17 01/01/18


 


Metoprolol Succinate [Toprol Xl] 25 mg PO DAILY 06/06/17 01/01/18


 


Nortriptyline HCl [Nortriptyline 75 mg PO QPM 06/06/17 01/01/18





HCl]   


 


Potassium Chloride [K-Tab ER] 30 meq PO DAILYWM 06/06/17 01/01/18


 


hydrOXYzine pamoate [Hydroxyzine 25 mg PO DAILY 01/01/18 01/01/18





Pamoate]   














- Allergies


Allergies/Adverse Reactions: 


 Allergies











Allergy/AdvReac Type Severity Reaction Status Date / Time


 


gabapentin AdvReac  Unknown Verified 01/01/18 15:40


 


metformin AdvReac  Nausea Verified 01/01/18 15:40














- Social History


Does the pt smoke?: Yes


Smoking Status: Current every day smoker


Does the pt drink ETOH?: Yes


Does the pt have substance abuse?: No





- Immunizations


Immunizations are current?: No


Immunizations: Other immun not current





- POLST


Patient has POLST: No





PD ED PE NORMAL





- Vitals


Vital signs reviewed: Yes (moving from EMS gurney to ER stretcher caused sats 

to drop to the 60s, the,)





- General


General: Alert and oriented X 3





- HEENT


HEENT: PERRL





- Neck


Neck: Supple, no meningeal sign





- Cardiac


Cardiac: RRR (distant)





- Respiratory


Respiratory: Other (decreased laure)





- Abdomen


Abdomen: Soft, Other (subcut edema but no erythema redness or warmth)





- Extremities


Extremities: Other (laure edema)





- Neuro


Neuro: Alert and oriented X 3





Results





- Vitals


Vitals: 


 Vital Signs - 24 hr











  01/01/18 01/01/18 01/01/18





  15:32 15:41 16:18


 


Temperature 36.2 C L  36.5 C


 


Heart Rate 94 91 88


 


Respiratory 22 22 18





Rate   


 


Blood Pressure 147/114 H 165/102 H 165/90 H


 


O2 Saturation 100 100 100














  01/01/18





  16:43


 


Temperature 


 


Heart Rate 88


 


Respiratory 12





Rate 


 


Blood Pressure 178/84 H


 


O2 Saturation 100








 Oxygen











O2 Source                      Nasal cannula


 


Oxygen Flow Rate               3

















- Labs


Labs: 


 Laboratory Tests











  01/01/18 01/01/18 01/01/18





  16:00 16:00 16:00


 


WBC  6.9  


 


RBC  5.39  


 


Hgb  12.6  


 


Hct  41.2  


 


MCV  76.4 L  


 


MCH  23.4 L  


 


MCHC  30.6 L  


 


RDW  19.6 H  


 


Plt Count  148  


 


MPV  10.0  


 


Neut #  4.7  


 


Lymph #  1.4 L  


 


Mono #  0.5  


 


Eos #  0.2  


 


Baso #  0.1  


 


Absolute Nucleated RBC  0.00  


 


Nucleated RBC %  0.1  


 


Sodium   141 


 


Potassium   3.8 


 


Chloride   107 


 


Carbon Dioxide   29 


 


Anion Gap   5.0 L 


 


BUN   23 H 


 


Creatinine   1.5 H 


 


Estimated GFR (MDRD)   37 L 


 


Glucose   203 H 


 


Lactic Acid    0.7


 


Calcium   8.7 


 


Troponin I   


 


B-Natriuretic Peptide   


 


Influenza A (Rapid)   


 


Influenza B (Rapid)   


 


Influenza Types A,B Ag   














  01/01/18 01/01/18 01/01/18





  16:00 16:00 16:00


 


WBC   


 


RBC   


 


Hgb   


 


Hct   


 


MCV   


 


MCH   


 


MCHC   


 


RDW   


 


Plt Count   


 


MPV   


 


Neut #   


 


Lymph #   


 


Mono #   


 


Eos #   


 


Baso #   


 


Absolute Nucleated RBC   


 


Nucleated RBC %   


 


Sodium   


 


Potassium   


 


Chloride   


 


Carbon Dioxide   


 


Anion Gap   


 


BUN   


 


Creatinine   


 


Estimated GFR (MDRD)   


 


Glucose   


 


Lactic Acid   


 


Calcium   


 


Troponin I  < 0.04  


 


B-Natriuretic Peptide   563 H 


 


Influenza A (Rapid)    Negative


 


Influenza B (Rapid)    Negative


 


Influenza Types A,B Ag    -














PD MEDICAL DECISION MAKING





- ED course


ED course: 





progressive SOA and body wide edema over weeks


was wheezing and given neb en route


sats dropped to 60s with moving from EMS gurney to ER bed, recovered to 80s, on 

2 L NC now


CXR limited by body habitus but suggests edema, BNP elev, trop neg


no fever and no infiltrate on CXR doubt pna


influenza swab neg


pt at risk for PE given lack of mobility - but cannot CTA 2/2 GFR and possible 

exceeding CT wt limit- and cannot VQ since baseline CXR is so limited


will give lovenox and lasix and admit for further wup such as echo


has had renal insuff before last April but got better now again


d/w hospitalist for admit at 5 PM








Departure





- Departure


Disposition: [facility] DC/Xfer


Clinical Impression: 


 Hypoxia, Renal insufficiency





Pulmonary edema


Qualifiers:


 Chronicity: acute Qualified Code(s): J81.0 - Acute pulmonary edema





Edema


Qualifiers:


 Edema type: generalized Qualified Code(s): R60.1 - Generalized edema





Condition: Good

## 2018-01-02 LAB
ANION GAP SERPL CALCULATED.4IONS-SCNC: 6 MMOL/L (ref 6–13)
BASOPHILS NFR BLD AUTO: 0 10^3/UL (ref 0–0.1)
BASOPHILS NFR BLD AUTO: 0.3 %
BUN SERPL-MCNC: 25 MG/DL (ref 6–20)
CALCIUM UR-MCNC: 8.5 MG/DL (ref 8.5–10.3)
CHLORIDE SERPL-SCNC: 107 MMOL/L (ref 101–111)
CO2 SERPL-SCNC: 28 MMOL/L (ref 21–32)
CREAT SERPLBLD-SCNC: 1.5 MG/DL (ref 0.4–1)
EOSINOPHIL # BLD AUTO: 0 10^3/UL (ref 0–0.7)
EOSINOPHIL NFR BLD AUTO: 0.1 %
ERYTHROCYTE [DISTWIDTH] IN BLOOD BY AUTOMATED COUNT: 20 % (ref 12–15)
EST. AVERAGE GLUCOSE BLD GHB EST-MCNC: 266 MG/DL (ref 70–100)
GFRSERPLBLD MDRD-ARVRAT: 37 ML/MIN/{1.73_M2} (ref 89–?)
GLUCOSE SERPL-MCNC: 292 MG/DL (ref 70–100)
HB2 TOTAL: 13.9 G/DL
HBA1C BLD-MCNC: 1.33 G/DL
HEMOGLOBIN A1C %: 10.9 % (ref 4.6–6.2)
HGB UR QL STRIP: 12.7 G/DL (ref 12–16)
LYMPHOCYTES # SPEC AUTO: 0.4 10^3/UL (ref 1.5–3.5)
LYMPHOCYTES NFR BLD AUTO: 4.7 %
MAGNESIUM SERPL-MCNC: 1.8 MG/DL (ref 1.7–2.8)
MCH RBC QN AUTO: 23.1 PG (ref 27–31)
MCHC RBC AUTO-ENTMCNC: 30 G/DL (ref 32–36)
MCV RBC AUTO: 77.1 FL (ref 81–99)
MONOCYTES # BLD AUTO: 0 10^3/UL (ref 0–1)
MONOCYTES NFR BLD AUTO: 0.6 %
NEUTROPHILS # BLD AUTO: 7.2 10^3/UL (ref 1.5–6.6)
NEUTROPHILS # SNV AUTO: 7.6 X10^3/UL (ref 4.8–10.8)
NEUTROPHILS NFR BLD AUTO: 94.3 %
PDW BLD AUTO: 10.7 FL (ref 7.9–10.8)
PLAT MORPH BLD: (no result)
PLATELET # BLD: 149 10^3/UL (ref 130–450)
PLATELET BLD QL SMEAR: (no result)
RBC MAR: 5.48 10^6/UL (ref 4.2–5.4)
RBC MORPH BLD: (no result)
SODIUM SERPLBLD-SCNC: 141 MMOL/L (ref 135–145)

## 2018-01-02 RX ADMIN — NORTRIPTYLINE HYDROCHLORIDE SCH: 25 CAPSULE ORAL at 20:47

## 2018-01-02 RX ADMIN — INSULIN ASPART SCH: 100 INJECTION, SOLUTION INTRAVENOUS; SUBCUTANEOUS at 19:01

## 2018-01-02 RX ADMIN — ENOXAPARIN SODIUM SCH: 100 INJECTION SUBCUTANEOUS at 09:22

## 2018-01-02 RX ADMIN — SODIUM CHLORIDE, PRESERVATIVE FREE SCH: 5 INJECTION INTRAVENOUS at 06:52

## 2018-01-02 RX ADMIN — INSULIN ASPART SCH UNIT: 100 INJECTION, SOLUTION INTRAVENOUS; SUBCUTANEOUS at 09:46

## 2018-01-02 RX ADMIN — IPRATROPIUM BROMIDE AND ALBUTEROL SULFATE SCH ML: 2.5; .5 SOLUTION RESPIRATORY (INHALATION) at 15:50

## 2018-01-02 RX ADMIN — IPRATROPIUM BROMIDE AND ALBUTEROL SULFATE SCH ML: 2.5; .5 SOLUTION RESPIRATORY (INHALATION) at 19:51

## 2018-01-02 RX ADMIN — SODIUM CHLORIDE, PRESERVATIVE FREE SCH ML: 5 INJECTION INTRAVENOUS at 10:00

## 2018-01-02 RX ADMIN — IPRATROPIUM BROMIDE AND ALBUTEROL SULFATE SCH: 2.5; .5 SOLUTION RESPIRATORY (INHALATION) at 11:02

## 2018-01-02 RX ADMIN — SODIUM CHLORIDE, PRESERVATIVE FREE PRN ML: 5 INJECTION INTRAVENOUS at 20:24

## 2018-01-02 RX ADMIN — PREGABALIN SCH MG: 100 CAPSULE ORAL at 10:57

## 2018-01-02 RX ADMIN — PREGABALIN SCH: 100 CAPSULE ORAL at 10:04

## 2018-01-02 RX ADMIN — INSULIN ASPART SCH UNIT: 100 INJECTION, SOLUTION INTRAVENOUS; SUBCUTANEOUS at 09:45

## 2018-01-02 RX ADMIN — INSULIN GLARGINE SCH UNIT: 100 INJECTION, SOLUTION SUBCUTANEOUS at 09:47

## 2018-01-02 RX ADMIN — FUROSEMIDE SCH MG: 10 INJECTION, SOLUTION INTRAVENOUS at 22:53

## 2018-01-02 RX ADMIN — POTASSIUM CHLORIDE SCH: 1500 TABLET, EXTENDED RELEASE ORAL at 10:04

## 2018-01-02 RX ADMIN — FUROSEMIDE SCH MG: 10 INJECTION, SOLUTION INTRAVENOUS at 09:49

## 2018-01-02 RX ADMIN — INSULIN ASPART SCH: 100 INJECTION, SOLUTION INTRAVENOUS; SUBCUTANEOUS at 08:15

## 2018-01-02 RX ADMIN — SODIUM CHLORIDE, PRESERVATIVE FREE SCH ML: 5 INJECTION INTRAVENOUS at 22:53

## 2018-01-02 RX ADMIN — INSULIN ASPART SCH UNIT: 100 INJECTION, SOLUTION INTRAVENOUS; SUBCUTANEOUS at 12:15

## 2018-01-02 RX ADMIN — INSULIN GLARGINE SCH UNIT: 100 INJECTION, SOLUTION SUBCUTANEOUS at 01:21

## 2018-01-02 RX ADMIN — ENOXAPARIN SODIUM SCH MG: 100 INJECTION SUBCUTANEOUS at 20:43

## 2018-01-02 RX ADMIN — SODIUM CHLORIDE, PRESERVATIVE FREE PRN ML: 5 INJECTION INTRAVENOUS at 14:42

## 2018-01-02 RX ADMIN — MORPHINE SULFATE PRN MG: 2 INJECTION, SOLUTION INTRAMUSCULAR; INTRAVENOUS at 20:22

## 2018-01-02 RX ADMIN — POTASSIUM CHLORIDE SCH: 1500 TABLET, EXTENDED RELEASE ORAL at 20:48

## 2018-01-02 NOTE — PROVIDER PROGRESS NOTE
Subjective





- Prog Note Date


Prog Note Date: 01/02/18


Prog Note Time: 07:07





- Subjective


Pt reports feeling: Improved





Objective





- Vital Signs/Intake & Output


Vital Signs: 


 Vital Signs x48h











  Temp Pulse Resp BP Pulse Ox


 


 01/02/18 00:48  97.7 C H  96  21  147/84 H  94











Intake & Output: 


 Intake & Output











 12/30/17 12/31/17 01/01/18 01/02/18





 23:59 23:59 23:59 23:59


 


Intake Total   300 450


 


Output Total   1550 1150


 


Balance   -1250 -700














- Lab Results


Fish Bones: 


 01/02/18 04:45





 01/02/18 04:45


Other Labs: 


 Lab Results x24hrs











  01/02/18 01/02/18 01/02/18 Range/Units





  04:45 04:45 04:45 


 


WBC     (4.8-10.8)  x10^3/uL


 


RBC     (4.20-5.40)  10^6/uL


 


Hgb     (12.0-16.0)  g/dL


 


Hct     (37.0-47.0)  %


 


MCV     (81.0-99.0)  fL


 


MCH     (27.0-31.0)  pg


 


MCHC     (32.0-36.0)  g/dL


 


RDW     (12.0-15.0)  %


 


Plt Count     (130-450)  10^3/uL


 


MPV     (7.9-10.8)  fL


 


Neut #     (1.5-6.6)  10^3/uL


 


Lymph #     (1.5-3.5)  10^3/uL


 


Mono #     (0.0-1.0)  10^3/uL


 


Eos #     (0.0-0.7)  10^3/uL


 


Baso #     (0.0-0.1)  10^3/uL


 


Absolute Nucleated RBC     x10^3/uL


 


Nucleated RBC %     /100WBC


 


Manual Slide Review     


 


Platelet Estimate     (NORMAL)  


 


Platelet Morphology     (NORMAL)  


 


RBC Morph Micro Appear     (NORMAL)  


 


Sodium    141  (135-145)  mmol/L


 


Potassium    4.5  (3.5-5.0)  mmol/L


 


Chloride    107  (101-111)  mmol/L


 


Carbon Dioxide    28  (21-32)  mmol/L


 


Anion Gap    6.0  (6-13)  


 


BUN    25 H  (6-20)  mg/dL


 


Creatinine    1.5 H  (0.4-1.0)  mg/dL


 


Estimated GFR (MDRD)    37 L  (>89)  


 


Glucose    292 H  ()  mg/dL


 


Glycated Hemoglobin  10.9 H    (4.6-6.2)  %


 


Estim Average Glucose  266 H    ()  


 


Calcium    8.5  (8.5-10.3)  mg/dL


 


Magnesium    1.8  (1.7-2.8)  mg/dL


 


B-Natriuretic Peptide   1146 H   (5-100)  pg/mL


 


Urine Color     


 


Urine Clarity     (CLEAR)  


 


Urine pH     (5.0-7.5)  PH


 


Ur Specific Gravity     (1.002-1.030)  


 


Urine Protein     (NEGATIVE)  mg/dL


 


Urine Glucose (UA)     (NEGATIVE)  mg/dL


 


Urine Ketones     (NEGATIVE)  mg/dL


 


Urine Occult Blood     (NEGATIVE)  


 


Urine Nitrite     (NEGATIVE)  


 


Urine Bilirubin     (NEGATIVE)  


 


Urine Urobilinogen     (NORMAL)  E.U./dL


 


Ur Leukocyte Esterase     (NEGATIVE)  


 


Urine RBC     (0-5)  /HPF


 


Urine WBC     (0-5)  /HPF


 


Ur Squamous Epith Cells     (<= Few)  


 


Amorphous Sediment     /LPF


 


Urine Bacteria     (None Seen)  /HPF


 


Urine Casts     /LPF


 


Ur Microscopic Review     


 


Urine Culture Comments     














  01/02/18 01/01/18 Range/Units





  04:45 21:20 


 


WBC  7.6   (4.8-10.8)  x10^3/uL


 


RBC  5.48 H   (4.20-5.40)  10^6/uL


 


Hgb  12.7   (12.0-16.0)  g/dL


 


Hct  42.3   (37.0-47.0)  %


 


MCV  77.1 L   (81.0-99.0)  fL


 


MCH  23.1 L   (27.0-31.0)  pg


 


MCHC  30.0 L   (32.0-36.0)  g/dL


 


RDW  20.0 H   (12.0-15.0)  %


 


Plt Count  149   (130-450)  10^3/uL


 


MPV  10.7   (7.9-10.8)  fL


 


Neut #  7.2 H   (1.5-6.6)  10^3/uL


 


Lymph #  0.4 L   (1.5-3.5)  10^3/uL


 


Mono #  0.0   (0.0-1.0)  10^3/uL


 


Eos #  0.0   (0.0-0.7)  10^3/uL


 


Baso #  0.0   (0.0-0.1)  10^3/uL


 


Absolute Nucleated RBC  0.01   x10^3/uL


 


Nucleated RBC %  0.2   /100WBC


 


Manual Slide Review  Indicated   


 


Platelet Estimate  NORMAL (130-450,000)   (NORMAL)  


 


Platelet Morphology  1+ LARGE PLATELETS   (NORMAL)  


 


RBC Morph Micro Appear  1+ TARGET CELLS   (NORMAL)  


 


Sodium    (135-145)  mmol/L


 


Potassium    (3.5-5.0)  mmol/L


 


Chloride    (101-111)  mmol/L


 


Carbon Dioxide    (21-32)  mmol/L


 


Anion Gap    (6-13)  


 


BUN    (6-20)  mg/dL


 


Creatinine    (0.4-1.0)  mg/dL


 


Estimated GFR (MDRD)    (>89)  


 


Glucose    ()  mg/dL


 


Glycated Hemoglobin    (4.6-6.2)  %


 


Estim Average Glucose    ()  


 


Calcium    (8.5-10.3)  mg/dL


 


Magnesium    (1.7-2.8)  mg/dL


 


B-Natriuretic Peptide    (5-100)  pg/mL


 


Urine Color   YELLOW  


 


Urine Clarity   HAZY  (CLEAR)  


 


Urine pH   6.0  (5.0-7.5)  PH


 


Ur Specific Gravity   1.025  (1.002-1.030)  


 


Urine Protein   >=300 H  (NEGATIVE)  mg/dL


 


Urine Glucose (UA)   250 H  (NEGATIVE)  mg/dL


 


Urine Ketones   NEGATIVE  (NEGATIVE)  mg/dL


 


Urine Occult Blood   MODERATE H  (NEGATIVE)  


 


Urine Nitrite   NEGATIVE  (NEGATIVE)  


 


Urine Bilirubin   NEGATIVE  (NEGATIVE)  


 


Urine Urobilinogen   0.2 (NORMAL)  (NORMAL)  E.U./dL


 


Ur Leukocyte Esterase   NEGATIVE  (NEGATIVE)  


 


Urine RBC   0-5  (0-5)  /HPF


 


Urine WBC   0-3  (0-5)  /HPF


 


Ur Squamous Epith Cells   NONE SEEN  (<= Few)  


 


Amorphous Sediment   Moderate  /LPF


 


Urine Bacteria   None Seen  (None Seen)  /HPF


 


Urine Casts   3-5 Hyaline Casts  /LPF


 


Ur Microscopic Review   INDICATED  


 


Urine Culture Comments   NOT INDICATED

## 2018-01-02 NOTE — PROVIDER PROGRESS NOTE
Assessment/Plan





- Problem List


(1) Respiratory distress


Assessment/Plan: 


The patient choked twice today and turned cyanotic, once a Rapid Response was 

called overhead for this.


The patient was started on BIPAP and given gentle sedation.


I suspect she has ZULY plus CHF.


Obtain an ABG and move to ICU today.


Continue diuresis, inhalers and Respiratory management








(2) Anasarca


Assessment/Plan: 


By exam she is possible 100 lbs fluid overloaded.


Will start iv diuretic, follow electrolytes, creat and daily weight


Awaiting Echo


In my opinion, she will require >96 hours for improvement, and I approached 

 and neice discussing the need for probable transfer to another 

facility. They both agree. Pt would not want to go to [facility], per .








(3) Morbid obesity


Assessment/Plan: 


The  and neice give me her entire history and admit that she took no 

meds whatsoever for about 2 mos.


She was not doing fingersticks or following a diabetic diet.


Continue plan and will need nutrionist help/ education if she will accept it (

since admission she becomes argumentative and refuses oxygen, Insulin, food or 

any medical management.  says she has a very stubborn streak).








(4) Diabetes


Assessment/Plan: 


Continue dietary restriction and ss Insulin coverage








- Current Meds


Current Meds: 





 Current Medications











Generic Name Dose Route Start Last Admin





  Trade Name Freq  PRN Reason Stop Dose Admin


 


Albuterol/Ipratropium  3 ml  01/02/18 11:00  01/02/18 15:50





  Duoneb  INH   3 ml





  RTQID PILY   Administration


 


Alprazolam  0.25 mg  01/01/18 21:04  01/02/18 09:56





  Xanax  PO   0.25 mg





  Q6HR PRN   Administration





  Anxiety   


 


Aspirin  81 mg  01/02/18 09:00  01/02/18 09:22





  Ecotrin  PO   Not Given





  DAILY PILY   


 


Enoxaparin Sodium  100 mg  01/01/18 21:00  01/02/18 09:22





  Lovenox  SUBQ   Not Given





  BID PILY   


 


Guaifenesin  600 mg  01/02/18 11:00  01/02/18 10:57





  Mucinex  PO   600 mg





  BID PILY   Administration


 


Hydroxyzine Pamoate  25 mg  01/02/18 09:00  01/02/18 10:04





  Vistaril  PO   Not Given





  DAILY PILY   


 


Levothyroxine Sodium  50 mcg  01/02/18 07:00  01/02/18 06:53





  Synthroid  PO   Not Given





  QDAC PILY   


 


Lorazepam  0.5 mg  01/02/18 13:31  01/02/18 14:42





  Ativan Inj (Vial)  IVP   0.5 mg





  Q2H PRN   Administration





  Anxiety   


 


Magnesium Oxide  400 mg  01/02/18 08:00  01/02/18 09:22





  Mag Ox  PO   Not Given





  DAILYWM PILY   


 


Nortriptyline HCl  75 mg  01/01/18 21:00  01/01/18 21:32





  Pamelor  PO   75 mg





  QPM PILY   Administration


 


Polyethylene Glycol  17 gm  01/02/18 09:00  01/02/18 10:04





  Miralax  PO   Not Given





  DAILY PILY   


 


Potassium Chloride  40 meq  01/02/18 09:00  01/02/18 10:04





  K-Dur  PO   Not Given





  BID PILY   


 


Pregabalin  300 mg  01/01/18 21:00  01/02/18 10:57





  Lyrica  PO   300 mg





  BID PILY   Administration


 


Sodium Chloride  10 ml  01/01/18 17:09  01/02/18 14:42





  Normal Saline Flush 0.9%  IVP   10 ml





  PRN PRN   Administration





  AS NEEDED PER PROVIDER ORDERS   


 


Sodium Chloride  10 ml  01/01/18 22:00  01/02/18 10:00





  Normal Saline Flush 0.9%  IVP   10 ml





  Q8HR PILY   Administration














- Lab Result


Fish Bone Diagrams: 


 01/02/18 04:45





 01/02/18 04:45





- Additional Planning


My Orders: 





My Active Orders





01/01/18 21:00


Nortriptyline [Pamelor]   75 mg PO QPM 


Pregabalin [Lyrica]   300 mg PO BID 





01/02/18 00:01


Nutrition Consult [CONS] Routine 





01/02/18 07:00


Levothyroxine [Synthroid]   50 mcg PO QDAC 





01/02/18 08:00


EKG - Electrocardiogram [RC] ONCE ONCE 


Echo Transthoracic w/Definity [ECHO] Routine 





01/02/18 09:00


Aspirin EC [Ecotrin]   81 mg PO DAILY 


hydrOXYzine PAMOATE [Vistaril]   25 mg PO DAILY 





01/02/18 12:39


BIPAP/CPAP - RT [RC] .Q2 





01/02/18 13:31


Arterial Blood Gases - RT [RC] .ONCE 


ABG - ARTERIAL BLOOD GAS [BG] Urgent 


LORazepam INJ [Ativan Inj (Vial)]   0.5 mg IVP Q2H PRN 





01/02/18 19:08


DIET [NPO except Meds] [DIET] 





01/02/18 22:00


FUROSEMIDE INJ 40mg VIAL [LASIX INJ 40 mg VIAL]   80 mg IVP TID 





01/03/18 05:00


BMP - BASIC METABOLIC PANEL [CHEM] DAILYLAB 


CBC - COMP BLD CT W/AUTO DIFF [HEME] DAILYLAB 














Subjective





- Subjective


Patient Reports: Other (Pt was refusing O2, Insulin, food, fingersticks)


Nursing Reports: Other (Pt had Rapid Response today)





Objective


Vital Signs: 





 Vital Signs - 24 hr











  01/01/18 01/02/18 01/02/18





  20:01 00:48 11:04


 


Temperature 36.6 C 97.7 C H 36.5 C


 


Heart Rate   


 


Heart Rate [ 92 96 88





Monitoring   





electrodes]   


 


Respiratory 13 21 22





Rate   


 


Blood Pressure  147/84 H 147/84 H





[Left Brachial   





artery]   


 


Blood Pressure 163/111 H  





[Right Brachial   





artery]   


 


O2 Saturation 99 94 98














  01/02/18 01/02/18 01/02/18





  12:00 12:50 13:25


 


Temperature   


 


Heart Rate  100 


 


Heart Rate [ 89  87





Monitoring   





electrodes]   


 


Respiratory 22  12





Rate   


 


Blood Pressure 135/64 H  





[Left Brachial   





artery]   


 


Blood Pressure   139/58 H





[Right Brachial   





artery]   


 


O2 Saturation 94  100














  01/02/18 01/02/18 01/02/18





  14:06 15:00 15:31


 


Temperature   35.8 C L


 


Heart Rate   


 


Heart Rate [ 82 77 





Monitoring   





electrodes]   


 


Respiratory 16 16 





Rate   


 


Blood Pressure   





[Left Brachial   





artery]   


 


Blood Pressure 140/67 H 119/47 L 





[Right Brachial   





artery]   


 


O2 Saturation 93 96 














  01/02/18 01/02/18 01/02/18





  15:45 15:50 16:00


 


Temperature   


 


Heart Rate 78 78 


 


Heart Rate [   78





Monitoring   





electrodes]   


 


Respiratory  15 15





Rate   


 


Blood Pressure   





[Left Brachial   





artery]   


 


Blood Pressure   118/57 L





[Right Brachial   





artery]   


 


O2 Saturation   92














  01/02/18 01/02/18 01/02/18





  17:05 18:00 18:58


 


Temperature  35.7 C L 


 


Heart Rate   


 


Heart Rate [ 80 77 79





Monitoring   





electrodes]   


 


Respiratory 16 17 11 L





Rate   


 


Blood Pressure   





[Left Brachial   





artery]   


 


Blood Pressure 115/36 L 125/64 125/64





[Right Brachial   





artery]   


 


O2 Saturation 92 95 96








 Oxygen











O2 Source                      BIPAP














I&O (Last 24 Hrs): 





 Intake and Output Totals x24h











 12/31/17 01/01/18 01/02/18





 23:59 23:59 23:59


 


Intake Total  300 1205


 


Output Total  1550 1726


 


Balance  -2428 -086














- Results


Results: 





 Laboratory Results











WBC  7.6 x10^3/uL (4.8-10.8)   01/02/18  04:45    


 


RBC  5.48 10^6/uL (4.20-5.40)  H  01/02/18  04:45    


 


Hgb  12.7 g/dL (12.0-16.0)   01/02/18  04:45    


 


Hct  42.3 % (37.0-47.0)   01/02/18  04:45    


 


MCV  77.1 fL (81.0-99.0)  L  01/02/18  04:45    


 


MCH  23.1 pg (27.0-31.0)  L  01/02/18  04:45    


 


MCHC  30.0 g/dL (32.0-36.0)  L  01/02/18  04:45    


 


RDW  20.0 % (12.0-15.0)  H  01/02/18  04:45    


 


Plt Count  149 10^3/uL (130-450)   01/02/18  04:45    


 


MPV  10.7 fL (7.9-10.8)   01/02/18  04:45    


 


Neut #  7.2 10^3/uL (1.5-6.6)  H  01/02/18  04:45    


 


Lymph #  0.4 10^3/uL (1.5-3.5)  L  01/02/18  04:45    


 


Mono #  0.0 10^3/uL (0.0-1.0)   01/02/18  04:45    


 


Eos #  0.0 10^3/uL (0.0-0.7)   01/02/18  04:45    


 


Baso #  0.0 10^3/uL (0.0-0.1)   01/02/18  04:45    


 


Absolute Nucleated RBC  0.01 x10^3/uL  01/02/18  04:45    


 


Nucleated RBC %  0.2 /100WBC  01/02/18  04:45    


 


Manual Slide Review  Indicated   01/02/18  04:45    


 


Platelet Estimate  NORMAL (130-450,000)  (NORMAL)   01/02/18  04:45    


 


Platelet Morphology  1+ LARGE PLATELETS  (NORMAL)   01/02/18  04:45    


 


RBC Morph Micro Appear  2+  ANISOCYTOSIS  (NORMAL) 1+ HYPOCHROMASIA  (NORMAL) 1

+ POLYCHROMASIA  (NORMAL) 1+ TARGET CELLS  (NORMAL)   01/02/18  04:45    


 


RBC Morph Micro Appear  2+  ANISOCYTOSIS  (NORMAL) 1+ HYPOCHROMASIA  (NORMAL) 1

+ POLYCHROMASIA  (NORMAL) 1+ TARGET CELLS  (NORMAL)   01/02/18  04:45    


 


RBC Morph Micro Appear  2+  ANISOCYTOSIS  (NORMAL) 1+ HYPOCHROMASIA  (NORMAL) 1

+ POLYCHROMASIA  (NORMAL) 1+ TARGET CELLS  (NORMAL)   01/02/18  04:45    


 


RBC Morph Micro Appear  2+  ANISOCYTOSIS  (NORMAL) 1+ HYPOCHROMASIA  (NORMAL) 1

+ POLYCHROMASIA  (NORMAL) 1+ TARGET CELLS  (NORMAL)   01/02/18  04:45    


 


Sodium  141 mmol/L (135-145)   01/02/18  04:45    


 


Potassium  4.5 mmol/L (3.5-5.0)   01/02/18  04:45    


 


Chloride  107 mmol/L (101-111)   01/02/18  04:45    


 


Carbon Dioxide  28 mmol/L (21-32)   01/02/18  04:45    


 


Anion Gap  6.0  (6-13)   01/02/18  04:45    


 


BUN  25 mg/dL (6-20)  H  01/02/18  04:45    


 


Creatinine  1.5 mg/dL (0.4-1.0)  H  01/02/18  04:45    


 


Estimated GFR (MDRD)  37  (>89)  L  01/02/18  04:45    


 


Glucose  292 mg/dL ()  H  01/02/18  04:45    


 


Glycated Hemoglobin  10.9 % (4.6-6.2)  H  01/02/18  04:45    


 


Estim Average Glucose  266  ()  H  01/02/18  04:45    


 


Lactic Acid  0.7 mmol/L (0.5-2.2)   01/01/18  16:00    


 


Calcium  8.5 mg/dL (8.5-10.3)   01/02/18  04:45    


 


Magnesium  1.8 mg/dL (1.7-2.8)   01/02/18  04:45    


 


Troponin I  < 0.04 ng/mL (<0.49)   01/01/18  16:00    


 


B-Natriuretic Peptide  1146 pg/mL (5-100)  H  01/02/18  04:45    


 


Urine Color  YELLOW   01/01/18  21:20    


 


Urine Clarity  HAZY  (CLEAR)   01/01/18  21:20    


 


Urine pH  6.0 PH (5.0-7.5)   01/01/18  21:20    


 


Ur Specific Gravity  1.025  (1.002-1.030)   01/01/18  21:20    


 


Urine Protein  >=300 mg/dL (NEGATIVE)  H  01/01/18  21:20    


 


Urine Glucose (UA)  250 mg/dL (NEGATIVE)  H  01/01/18  21:20    


 


Urine Ketones  NEGATIVE mg/dL (NEGATIVE)   01/01/18  21:20    


 


Urine Occult Blood  MODERATE  (NEGATIVE)  H  01/01/18  21:20    


 


Urine Nitrite  NEGATIVE  (NEGATIVE)   01/01/18  21:20    


 


Urine Bilirubin  NEGATIVE  (NEGATIVE)   01/01/18  21:20    


 


Urine Urobilinogen  0.2 (NORMAL) E.U./dL (NORMAL)   01/01/18  21:20    


 


Ur Leukocyte Esterase  NEGATIVE  (NEGATIVE)   01/01/18  21:20    


 


Urine RBC  0-5 /HPF (0-5)   01/01/18  21:20    


 


Urine WBC  0-3 /HPF (0-5)   01/01/18  21:20    


 


Ur Squamous Epith Cells  NONE SEEN  (<= Few)   01/01/18  21:20    


 


Amorphous Sediment  Moderate /LPF  01/01/18  21:20    


 


Urine Bacteria  None Seen /HPF (None Seen)   01/01/18  21:20    


 


Urine Casts  0-2 Fine Granular /LPF3-5 Hyaline Casts /LPF  01/01/18  21:20    


 


Urine Casts  0-2 Fine Granular /LPF3-5 Hyaline Casts /LPF  01/01/18  21:20    


 


Ur Microscopic Review  INDICATED   01/01/18  21:20    


 


Urine Culture Comments  NOT INDICATED   01/01/18  21:20    


 


Influenza A (Rapid)  Negative  (Negative)   01/01/18  16:00    


 


Influenza B (Rapid)  Negative  (Negative)   01/01/18  16:00    


 


Influenza Types A,B Ag  -   01/01/18  16:00

## 2018-01-03 RX ADMIN — SODIUM CHLORIDE, PRESERVATIVE FREE PRN ML: 5 INJECTION INTRAVENOUS at 09:48

## 2018-01-03 RX ADMIN — IPRATROPIUM BROMIDE AND ALBUTEROL SULFATE SCH: 2.5; .5 SOLUTION RESPIRATORY (INHALATION) at 20:31

## 2018-01-03 RX ADMIN — FAMOTIDINE SCH MLS/HR: 20 INJECTION, SOLUTION INTRAVENOUS at 10:45

## 2018-01-03 RX ADMIN — FUROSEMIDE SCH MG: 10 INJECTION, SOLUTION INTRAVENOUS at 05:32

## 2018-01-03 RX ADMIN — SODIUM CHLORIDE, PRESERVATIVE FREE SCH ML: 5 INJECTION INTRAVENOUS at 05:32

## 2018-01-03 RX ADMIN — MORPHINE SULFATE PRN MG: 2 INJECTION, SOLUTION INTRAMUSCULAR; INTRAVENOUS at 17:36

## 2018-01-03 RX ADMIN — FUROSEMIDE SCH MG: 10 INJECTION, SOLUTION INTRAVENOUS at 15:19

## 2018-01-03 RX ADMIN — MORPHINE SULFATE PRN MG: 2 INJECTION, SOLUTION INTRAMUSCULAR; INTRAVENOUS at 07:20

## 2018-01-03 RX ADMIN — ENOXAPARIN SODIUM SCH MG: 100 INJECTION SUBCUTANEOUS at 11:09

## 2018-01-03 RX ADMIN — IPRATROPIUM BROMIDE AND ALBUTEROL SULFATE SCH ML: 2.5; .5 SOLUTION RESPIRATORY (INHALATION) at 22:12

## 2018-01-03 RX ADMIN — MORPHINE SULFATE PRN MG: 2 INJECTION, SOLUTION INTRAMUSCULAR; INTRAVENOUS at 05:28

## 2018-01-03 RX ADMIN — FUROSEMIDE SCH MG: 10 INJECTION, SOLUTION INTRAVENOUS at 22:02

## 2018-01-03 RX ADMIN — SODIUM CHLORIDE, PRESERVATIVE FREE SCH: 5 INJECTION INTRAVENOUS at 17:24

## 2018-01-03 RX ADMIN — SODIUM CHLORIDE, PRESERVATIVE FREE PRN ML: 5 INJECTION INTRAVENOUS at 11:19

## 2018-01-03 RX ADMIN — ENOXAPARIN SODIUM SCH MG: 100 INJECTION SUBCUTANEOUS at 20:50

## 2018-01-03 RX ADMIN — FAMOTIDINE SCH MLS/HR: 20 INJECTION, SOLUTION INTRAVENOUS at 20:49

## 2018-01-03 RX ADMIN — SODIUM CHLORIDE, PRESERVATIVE FREE PRN ML: 5 INJECTION INTRAVENOUS at 07:20

## 2018-01-03 RX ADMIN — SODIUM CHLORIDE, PRESERVATIVE FREE SCH ML: 5 INJECTION INTRAVENOUS at 22:02

## 2018-01-03 RX ADMIN — IPRATROPIUM BROMIDE AND ALBUTEROL SULFATE SCH ML: 2.5; .5 SOLUTION RESPIRATORY (INHALATION) at 15:02

## 2018-01-03 RX ADMIN — IPRATROPIUM BROMIDE AND ALBUTEROL SULFATE SCH ML: 2.5; .5 SOLUTION RESPIRATORY (INHALATION) at 11:22

## 2018-01-03 RX ADMIN — SODIUM CHLORIDE, PRESERVATIVE FREE PRN ML: 5 INJECTION INTRAVENOUS at 05:29

## 2018-01-03 RX ADMIN — IPRATROPIUM BROMIDE AND ALBUTEROL SULFATE SCH ML: 2.5; .5 SOLUTION RESPIRATORY (INHALATION) at 08:13

## 2018-01-03 RX ADMIN — SODIUM CHLORIDE, PRESERVATIVE FREE PRN ML: 5 INJECTION INTRAVENOUS at 07:10

## 2018-01-03 NOTE — PROVIDER PROGRESS NOTE
Assessment/Plan





- Problem List


(1) Respiratory distress


Assessment/Plan: 


Pt continues to need BIPAP when she gets sedated.


She needs sedation in order to get oxygenation, otherwise she refuses O2 and 

all her care.


I discussed the management with her . Will try to transfer to a hospital 

with higher level of care, as she will need > 1week for diuresis to improve 

respiratory status and would benefit from specialists: Pulmonologist, 

Cardiologist, Diabetologist and possibly Psychiatrist (given her personality 

and outbursts).


I called the following hospitals today for transfer to an Intensivist and none 

had ICU beds open: [facility], [facility], [facility], [facility], [facility], [facility] (but not [facility]), [facility] in [place] and [facility] in 

[place].


Continue BIPAP management and gentle sedation vs Haldol or Zaprexa as well as 

wrist restraints for safety.








(2) Anasarca


Assessment/Plan: 


Continue diuresis


Watch BUN/creat and Mg, electrolytes.


Follow daily weights








(3) Morbid obesity


Assessment/Plan: 


Po diet changed to iv as she needs sedation for BIPAP use.


All po meds changed to iv


iv Pepcid started for PUD prophylaxis.








(4) Diabetes


Assessment/Plan: 


iv D5 needed today for low glu, as she is now npo


Continue plan








- Current Meds


Current Meds: 





 Current Medications











Generic Name Dose Route Start Last Admin





  Trade Name Freq  PRN Reason Stop Dose Admin


 


Albuterol/Ipratropium  3 ml  01/02/18 11:00  01/03/18 11:22





  Duoneb  INH   3 ml





  RTQID PILY   Administration


 


Famotidine  50 mls @ 100 mls/hr  01/03/18 09:00  01/03/18 11:20





  Pepcid 20 Mg/50 Ml  IV   Infused





  BID PILY   Infusion


 


Morphine Sulfate  2 mg  01/01/18 17:09  01/03/18 07:20





  Morphine  IVP   2 mg





  Q2H PRN   Administration





  Pain 8 to 10   


 


Sodium Chloride  10 ml  01/01/18 17:09  01/03/18 11:19





  Normal Saline Flush 0.9%  IVP   10 ml





  PRN PRN   Administration





  AS NEEDED PER PROVIDER ORDERS   


 


Sodium Chloride  10 ml  01/01/18 22:00  01/03/18 05:32





  Normal Saline Flush 0.9%  IVP   10 ml





  Q8HR PILY   Administration














- Lab Result


Fish Bone Diagrams: 


 01/02/18 04:45





 01/02/18 04:45





- Additional Planning


My Orders: 





My Active Orders





01/03/18


VENOUS BLOOD GAS [BG] Routine 





01/03/18 05:00


BMP - BASIC METABOLIC PANEL [CHEM] DAILYLAB 


CBC - COMP BLD CT W/AUTO DIFF [HEME] DAILYLAB 





01/03/18 09:00


Chest 1 View X-Ray [XR] DAILY 


Famotidine 20 mg/50 ml [Pepcid 20 mg/50 ml] 50 ml IV BID 





01/03/18 10:15


PICC Line Care [RC] Q4H 


PICC Line Insert [RC] .ONCE 





01/03/18 11:46


FUROSEMIDE INJ 40mg VIAL [LASIX INJ 40 mg VIAL]   120 mg IVP TID 





01/03/18 21:00


Enoxaparin [Lovenox]   100 mg SUBQ BID 





01/03/18 Lunch


DIET [Clear Liquid Diet] [DIET] 





01/04/18 05:00


MAGNESIUM [CHEM] DAILYLAB 














Subjective





- Subjective


Patient Reports: Other (I see Pt when she is sedated and cannot get direct 

answers from her. I spoke to  and neice again today at length.)


Nursing Reports: Other (RN describes that Pt was refusing all medical care and 

being belligerent, the  witnessed this and could not calm her down.)





Objective


Vital Signs: 





 Vital Signs - 24 hr











  01/02/18 01/02/18 01/02/18





  15:00 15:31 15:45


 


Temperature  35.8 C L 


 


Heart Rate   78


 


Heart Rate [ 77  





Monitoring   





electrodes]   


 


Respiratory 16  





Rate   


 


Blood Pressure 119/47 L  





[Right Brachial   





artery]   


 


O2 Saturation 96  














  01/02/18 01/02/18 01/02/18





  15:50 16:00 17:05


 


Temperature   


 


Heart Rate 78  


 


Heart Rate [  78 80





Monitoring   





electrodes]   


 


Respiratory 15 15 16





Rate   


 


Blood Pressure  118/57 L 115/36 L





[Right Brachial   





artery]   


 


O2 Saturation  92 92














  01/02/18 01/02/18 01/02/18





  18:00 18:58 19:35


 


Temperature 35.7 C L  


 


Heart Rate   78


 


Heart Rate [ 77 79 





Monitoring   





electrodes]   


 


Respiratory 17 11 L 





Rate   


 


Blood Pressure 125/64 125/64 





[Right Brachial   





artery]   


 


O2 Saturation 95 96 














  01/02/18 01/02/18 01/02/18





  19:51 20:00 21:41


 


Temperature  35.5 C L 


 


Heart Rate 73  73


 


Heart Rate [  72 





Monitoring   





electrodes]   


 


Respiratory 14 15 





Rate   


 


Blood Pressure  116/56 L 





[Right Brachial   





artery]   


 


O2 Saturation  100 














  01/02/18 01/02/18 01/03/18





  22:53 23:00 00:00


 


Temperature   36.6 C


 


Heart Rate   


 


Heart Rate [ 71 71 77





Monitoring   





electrodes]   


 


Respiratory 15 14 12





Rate   


 


Blood Pressure 129/65 120/64 113/67





[Right Brachial   





artery]   


 


O2 Saturation 99 100 100














  01/03/18 01/03/18 01/03/18





  01:00 01:10 01:16


 


Temperature   


 


Heart Rate  70 70


 


Heart Rate [ 70  





Monitoring   





electrodes]   


 


Respiratory 12  





Rate   


 


Blood Pressure 111/61  





[Right Brachial   





artery]   


 


O2 Saturation 100  














  01/03/18 01/03/18 01/03/18





  02:00 03:00 03:19


 


Temperature   


 


Heart Rate   69


 


Heart Rate [ 70 70 





Monitoring   





electrodes]   


 


Respiratory 12 12 





Rate   


 


Blood Pressure 125/67 115/71 





[Right Brachial   





artery]   


 


O2 Saturation 100 100 














  01/03/18 01/03/18 01/03/18





  04:00 05:00 05:24


 


Temperature   


 


Heart Rate   74


 


Heart Rate [ 70 72 





Monitoring   





electrodes]   


 


Respiratory 12 12 





Rate   


 


Blood Pressure 121/70 127/73 





[Right Brachial   





artery]   


 


O2 Saturation 100 100 














  01/03/18 01/03/18 01/03/18





  06:00 06:58 07:18


 


Temperature   


 


Heart Rate   


 


Heart Rate [ 72 73 





Monitoring   





electrodes]   


 


Respiratory 14 18 





Rate   


 


Blood Pressure 113/67  113/67





[Right Brachial   





artery]   


 


O2 Saturation 97 99 78 L














  01/03/18 01/03/18 01/03/18





  07:22 08:00 08:20


 


Temperature   


 


Heart Rate   79


 


Heart Rate [  77 





Monitoring   





electrodes]   


 


Respiratory  16 14





Rate   


 


Blood Pressure  112/63 





[Right Brachial   





artery]   


 


O2 Saturation 89 L  














  01/03/18 01/03/18 01/03/18





  09:00 10:00 11:00


 


Temperature   


 


Heart Rate   


 


Heart Rate [ 81 82 82





Monitoring   





electrodes]   


 


Respiratory 28 H 13 12





Rate   


 


Blood Pressure  98/46 L 94/62





[Right Brachial   





artery]   


 


O2 Saturation  95 95














  01/03/18 01/03/18 01/03/18





  11:10 11:23 12:00


 


Temperature   


 


Heart Rate 82 84 


 


Heart Rate [   85





Monitoring   





electrodes]   


 


Respiratory  16 13





Rate   


 


Blood Pressure   101/49 L





[Right Brachial   





artery]   


 


O2 Saturation   99














  01/03/18 01/03/18 01/03/18





  12:59 13:00 14:04


 


Temperature  34.6 C L 34.7 C L


 


Heart Rate 83  


 


Heart Rate [  82 82





Monitoring   





electrodes]   


 


Respiratory  16 17





Rate   


 


Blood Pressure  101/49 L 102/49 L





[Right Brachial   





artery]   


 


O2 Saturation  95 








 Oxygen











O2 Source                      BIPAP














I&O (Last 24 Hrs): 





 Intake and Output Totals x24h











 01/01/18 01/02/18 01/03/18





 23:59 23:59 23:59


 


Intake Total 300 1205 50


 


Output Total 1550 1806 435


 


Balance -8739 -606 -489











General: Other (sedated)


HEENT: Other (Lips cynaotic)


Neck: Other (Obese)


Cardiovascular: Regular rate


Respiratory: Other (Diminished)


Abdomen: Other (Obese with pannus)


Extremities: Other (venous stasis changes of shins. 4+ anasarca to chin.)





- Results


Results: 





 Laboratory Results











WBC  7.6 x10^3/uL (4.8-10.8)   01/02/18  04:45    


 


RBC  5.48 10^6/uL (4.20-5.40)  H  01/02/18  04:45    


 


Hgb  12.7 g/dL (12.0-16.0)   01/02/18  04:45    


 


Hct  42.3 % (37.0-47.0)   01/02/18  04:45    


 


MCV  77.1 fL (81.0-99.0)  L  01/02/18  04:45    


 


MCH  23.1 pg (27.0-31.0)  L  01/02/18  04:45    


 


MCHC  30.0 g/dL (32.0-36.0)  L  01/02/18  04:45    


 


RDW  20.0 % (12.0-15.0)  H  01/02/18  04:45    


 


Plt Count  149 10^3/uL (130-450)   01/02/18  04:45    


 


MPV  10.7 fL (7.9-10.8)   01/02/18  04:45    


 


Neut #  7.2 10^3/uL (1.5-6.6)  H  01/02/18  04:45    


 


Lymph #  0.4 10^3/uL (1.5-3.5)  L  01/02/18  04:45    


 


Mono #  0.0 10^3/uL (0.0-1.0)   01/02/18  04:45    


 


Eos #  0.0 10^3/uL (0.0-0.7)   01/02/18  04:45    


 


Baso #  0.0 10^3/uL (0.0-0.1)   01/02/18  04:45    


 


Absolute Nucleated RBC  0.01 x10^3/uL  01/02/18  04:45    


 


Nucleated RBC %  0.2 /100WBC  01/02/18  04:45    


 


Manual Slide Review  Indicated   01/02/18  04:45    


 


Platelet Estimate  NORMAL (130-450,000)  (NORMAL)   01/02/18  04:45    


 


Platelet Morphology  1+ LARGE PLATELETS  (NORMAL)   01/02/18  04:45    


 


RBC Morph Micro Appear  2+  ANISOCYTOSIS  (NORMAL) 1+ HYPOCHROMASIA  (NORMAL) 1

+ POLYCHROMASIA  (NORMAL) 1+ [facility] CELLS  (NORMAL)   01/02/18  04:45    


 


RBC Morph Micro Appear  2+  ANISOCYTOSIS  (NORMAL) 1+ HYPOCHROMASIA  (NORMAL) 1

+ POLYCHROMASIA  (NORMAL) 1+ [employer] CELLS  (NORMAL)   01/02/18  04:45    


 


RBC Morph Micro Appear  2+  ANISOCYTOSIS  (NORMAL) 1+ HYPOCHROMASIA  (NORMAL) 1

+ POLYCHROMASIA  (NORMAL) 1+ [facility] CELLS  (NORMAL)   01/02/18  04:45    


 


RBC Morph Micro Appear  2+  ANISOCYTOSIS  (NORMAL) 1+ HYPOCHROMASIA  (NORMAL) 1

+ POLYCHROMASIA  (NORMAL) 1+ [facility] CELLS  (NORMAL)   01/02/18  04:45    


 


Sodium  141 mmol/L (135-145)   01/02/18  04:45    


 


Potassium  4.5 mmol/L (3.5-5.0)   01/02/18  04:45    


 


Chloride  107 mmol/L (101-111)   01/02/18  04:45    


 


Carbon Dioxide  28 mmol/L (21-32)   01/02/18  04:45    


 


Anion Gap  6.0  (6-13)   01/02/18  04:45    


 


BUN  25 mg/dL (6-20)  H  01/02/18  04:45    


 


Creatinine  1.5 mg/dL (0.4-1.0)  H  01/02/18  04:45    


 


Estimated GFR (MDRD)  37  (>89)  L  01/02/18  04:45    


 


Glucose  292 mg/dL ()  H  01/02/18  04:45    


 


Glycated Hemoglobin  10.9 % (4.6-6.2)  H  01/02/18  04:45    


 


Estim Average Glucose  266  ()  H  01/02/18  04:45    


 


Lactic Acid  0.7 mmol/L (0.5-2.2)   01/01/18  16:00    


 


Calcium  8.5 mg/dL (8.5-10.3)   01/02/18  04:45    


 


Magnesium  1.8 mg/dL (1.7-2.8)   01/02/18  04:45    


 


Troponin I  < 0.04 ng/mL (<0.49)   01/01/18  16:00    


 


B-Natriuretic Peptide  1146 pg/mL (5-100)  H  01/02/18  04:45    


 


Urine Color  YELLOW   01/01/18  21:20    


 


Urine Clarity  HAZY  (CLEAR)   01/01/18  21:20    


 


Urine pH  6.0 PH (5.0-7.5)   01/01/18  21:20    


 


Ur Specific Gravity  1.025  (1.002-1.030)   01/01/18  21:20    


 


Urine Protein  >=300 mg/dL (NEGATIVE)  H  01/01/18  21:20    


 


Urine Glucose (UA)  250 mg/dL (NEGATIVE)  H  01/01/18  21:20    


 


Urine Ketones  NEGATIVE mg/dL (NEGATIVE)   01/01/18  21:20    


 


Urine Occult Blood  MODERATE  (NEGATIVE)  H  01/01/18  21:20    


 


Urine Nitrite  NEGATIVE  (NEGATIVE)   01/01/18  21:20    


 


Urine Bilirubin  NEGATIVE  (NEGATIVE)   01/01/18  21:20    


 


Urine Urobilinogen  0.2 (NORMAL) E.U./dL (NORMAL)   01/01/18  21:20    


 


Ur Leukocyte Esterase  NEGATIVE  (NEGATIVE)   01/01/18  21:20    


 


Urine RBC  0-5 /HPF (0-5)   01/01/18  21:20    


 


Urine WBC  0-3 /HPF (0-5)   01/01/18  21:20    


 


Ur Squamous Epith Cells  NONE SEEN  (<= Few)   01/01/18  21:20    


 


Amorphous Sediment  Moderate /LPF  01/01/18  21:20    


 


Urine Bacteria  None Seen /HPF (None Seen)   01/01/18  21:20    


 


Urine Casts  0-2 Fine Granular /LPF3-5 Hyaline Casts /LPF  01/01/18  21:20    


 


Urine Casts  0-2 Fine Granular /LPF3-5 Hyaline Casts /LPF  01/01/18  21:20    


 


Ur Microscopic Review  INDICATED   01/01/18  21:20    


 


Urine Culture Comments  NOT INDICATED   01/01/18  21:20    


 


Influenza A (Rapid)  Negative  (Negative)   01/01/18  16:00    


 


Influenza B (Rapid)  Negative  (Negative)   01/01/18  16:00    


 


Influenza Types A,B Ag  -   01/01/18  16:00

## 2018-01-04 LAB
ANION GAP SERPL CALCULATED.4IONS-SCNC: 8 MMOL/L (ref 6–13)
ARTERIAL PATENCY WRIST A: POSITIVE
BASE EXCESS BLDMV CALC-SCNC: 0.3 MMOL/L (ref -2–3)
BASE EXCESS BLDMV CALC-SCNC: 0.6 MMOL/L (ref -2–3)
BASE EXCESS BLDMV CALC-SCNC: 0.7 MMOL/L (ref -2–3)
BASOPHILS NFR BLD AUTO: 0.1 10^3/UL (ref 0–0.1)
BASOPHILS NFR BLD AUTO: 0.7 %
BUN SERPL-MCNC: 29 MG/DL (ref 6–20)
CALCIUM UR-MCNC: 8.5 MG/DL (ref 8.5–10.3)
CHLORIDE SERPL-SCNC: 106 MMOL/L (ref 101–111)
CO2 BLDA CALC-SCNC: 30.1 MMOL/L (ref 21–29)
CO2 BLDA CALC-SCNC: 31.4 MMOL/L (ref 21–29)
CO2 BLDA CALC-SCNC: 31.9 MMOL/L (ref 21–29)
CO2 SERPL-SCNC: 28 MMOL/L (ref 21–32)
CREAT SERPLBLD-SCNC: 1.8 MG/DL (ref 0.4–1)
DEPRECATED HCO3 PLAS-SCNC: 28.3 MMOL/L (ref 22–26)
DEPRECATED HCO3 PLAS-SCNC: 29.3 MMOL/L (ref 22–26)
DEPRECATED HCO3 PLAS-SCNC: 29.7 MMOL/L (ref 22–26)
EOSINOPHIL # BLD AUTO: 0.1 10^3/UL (ref 0–0.7)
EOSINOPHIL NFR BLD AUTO: 1.9 %
ERYTHROCYTE [DISTWIDTH] IN BLOOD BY AUTOMATED COUNT: 20.1 % (ref 12–15)
GFRSERPLBLD MDRD-ARVRAT: 30 ML/MIN/{1.73_M2} (ref 89–?)
GLUCOSE SERPL-MCNC: 102 MG/DL (ref 70–100)
HGB UR QL STRIP: 11.9 G/DL (ref 12–16)
LYMPHOCYTES # SPEC AUTO: 1.1 10^3/UL (ref 1.5–3.5)
LYMPHOCYTES NFR BLD AUTO: 13.7 %
MCH RBC QN AUTO: 23.7 PG (ref 27–31)
MCHC RBC AUTO-ENTMCNC: 31.3 G/DL (ref 32–36)
MCV RBC AUTO: 75.7 FL (ref 81–99)
MONOCYTES # BLD AUTO: 0.5 10^3/UL (ref 0–1)
MONOCYTES NFR BLD AUTO: 6.6 %
NEUTROPHILS # BLD AUTO: 6 10^3/UL (ref 1.5–6.6)
NEUTROPHILS # SNV AUTO: 7.8 X10^3/UL (ref 4.8–10.8)
NEUTROPHILS NFR BLD AUTO: 77.1 %
PCO2 TEMP ADJ BLDCOA: 59 MMHG (ref 34–45)
PCO2 TEMP ADJ BLDCOA: 68 MMHG (ref 34–45)
PCO2 TEMP ADJ BLDCOA: 73 MMHG (ref 34–45)
PDW BLD AUTO: 10 FL (ref 7.9–10.8)
PH TEMP ADJ BLDA: 7.23 [PH] (ref 7.35–7.45)
PH TEMP ADJ BLDA: 7.25 [PH] (ref 7.35–7.45)
PH TEMP ADJ BLDA: 7.3 [PH] (ref 7.35–7.45)
PLATELET # BLD: 139 10^3/UL (ref 130–450)
PO2 TEMP ADJ BLDCOA: 104 MMHG (ref 80–100)
PO2 TEMP ADJ BLDCOA: 126 MMHG (ref 80–100)
PO2 TEMP ADJ BLDCOA: 66 MMHG (ref 80–100)
RBC MAR: 5.02 10^6/UL (ref 4.2–5.4)
SAO2 % BLDA FROM PO2: 93 % (ref 94–98)
SAO2 % BLDA FROM PO2: 98 % (ref 94–98)
SAO2 % BLDA FROM PO2: 99 % (ref 94–98)
SODIUM SERPLBLD-SCNC: 142 MMOL/L (ref 135–145)

## 2018-01-04 PROCEDURE — 02HV33Z INSERTION OF INFUSION DEVICE INTO SUPERIOR VENA CAVA, PERCUTANEOUS APPROACH: ICD-10-PCS

## 2018-01-04 RX ADMIN — SODIUM CHLORIDE, PRESERVATIVE FREE SCH: 5 INJECTION INTRAVENOUS at 05:32

## 2018-01-04 RX ADMIN — IPRATROPIUM BROMIDE AND ALBUTEROL SULFATE SCH ML: 2.5; .5 SOLUTION RESPIRATORY (INHALATION) at 11:21

## 2018-01-04 RX ADMIN — ENOXAPARIN SODIUM SCH MG: 100 INJECTION SUBCUTANEOUS at 08:46

## 2018-01-04 RX ADMIN — MORPHINE SULFATE PRN MG: 2 INJECTION, SOLUTION INTRAMUSCULAR; INTRAVENOUS at 02:45

## 2018-01-04 RX ADMIN — MORPHINE SULFATE PRN MG: 2 INJECTION, SOLUTION INTRAMUSCULAR; INTRAVENOUS at 08:46

## 2018-01-04 RX ADMIN — FUROSEMIDE SCH MG: 10 INJECTION, SOLUTION INTRAVENOUS at 05:31

## 2018-01-04 RX ADMIN — FUROSEMIDE SCH MG: 10 INJECTION, SOLUTION INTRAVENOUS at 15:42

## 2018-01-04 RX ADMIN — FAMOTIDINE SCH MLS/HR: 20 INJECTION, SOLUTION INTRAVENOUS at 21:08

## 2018-01-04 RX ADMIN — IPRATROPIUM BROMIDE AND ALBUTEROL SULFATE SCH ML: 2.5; .5 SOLUTION RESPIRATORY (INHALATION) at 15:10

## 2018-01-04 RX ADMIN — IPRATROPIUM BROMIDE AND ALBUTEROL SULFATE SCH ML: 2.5; .5 SOLUTION RESPIRATORY (INHALATION) at 07:30

## 2018-01-04 RX ADMIN — MORPHINE SULFATE PRN MG: 2 INJECTION, SOLUTION INTRAMUSCULAR; INTRAVENOUS at 21:44

## 2018-01-04 RX ADMIN — ENOXAPARIN SODIUM SCH MG: 100 INJECTION SUBCUTANEOUS at 21:08

## 2018-01-04 RX ADMIN — FUROSEMIDE SCH MG: 10 INJECTION, SOLUTION INTRAVENOUS at 21:44

## 2018-01-04 RX ADMIN — FAMOTIDINE SCH MLS/HR: 20 INJECTION, SOLUTION INTRAVENOUS at 08:45

## 2018-01-04 RX ADMIN — SODIUM CHLORIDE, PRESERVATIVE FREE SCH ML: 5 INJECTION INTRAVENOUS at 21:45

## 2018-01-04 RX ADMIN — SODIUM CHLORIDE, PRESERVATIVE FREE SCH ML: 5 INJECTION INTRAVENOUS at 15:43

## 2018-01-04 RX ADMIN — IPRATROPIUM BROMIDE AND ALBUTEROL SULFATE SCH ML: 2.5; .5 SOLUTION RESPIRATORY (INHALATION) at 18:10

## 2018-01-04 RX ADMIN — MORPHINE SULFATE PRN MG: 2 INJECTION, SOLUTION INTRAMUSCULAR; INTRAVENOUS at 17:42

## 2018-01-04 NOTE — XRAY REPORT
DATE OF SERVICE: 01/03/2018

 

PORTABLE CHEST: 01/03/2018.

 

COMPARISON:  Frontal chest 01/01/2018.

 

INDICATION:  CHF.

 

TECHNIQUE:  Frontal portable chest view.

 

FINDINGS:  There is interval whiteout of the left hemithorax.  There is 
persistent and somewhat worse patchy 

opacity of the right mid to lower lung.  The trachea appears midline.  No 
pneumothorax.  Difficult to exclude 

pleural effusions.  The mediastinum is obscured by opacity, but cardiomegaly is 
suggested.

 

IMPRESSION:  INTERVAL WORSENING OF BILATERAL OPACITY WITH WHITEOUT OF THE LEFT 
HEMITHORAX. FINDINGS ARE 

FAVORED TO REPRESENT AN ALVEOLAR FILLING PROCESS SUCH AS CHF AND/OR INFECTION. 
ARDS WOULD BE A CONSIDERATION.

 

OTHER PROCESS SUCH AS MUCUS PLUGGING IS LESS LIKELY GIVEN THE LACK OF MIDLINE 
SHIFT.

 

 

 

DD: 01/03/2018 08:50

TD: 01/03/2018 10:28

Job #: [id number]

MTDD

## 2018-01-05 ENCOUNTER — HOSPITAL ENCOUNTER (OUTPATIENT)
Dept: HOSPITAL 76 - EMS | Age: 53
Discharge: ANOTHER HOSPITAL | End: 2018-01-05
Attending: SURGERY
Payer: COMMERCIAL

## 2018-01-05 VITALS — DIASTOLIC BLOOD PRESSURE: 77 MMHG | SYSTOLIC BLOOD PRESSURE: 168 MMHG

## 2018-01-05 DIAGNOSIS — J96.90: Primary | ICD-10-CM

## 2018-01-05 LAB
ALBUMIN DIAFP-MCNC: 2.2 G/DL (ref 3.2–5.5)
ALBUMIN/GLOB SERPL: 0.6 {RATIO} (ref 1–2.2)
ALP SERPL-CCNC: 74 IU/L (ref 42–121)
ALT SERPL W P-5'-P-CCNC: 13 IU/L (ref 10–60)
ANION GAP SERPL CALCULATED.4IONS-SCNC: 9 MMOL/L (ref 6–13)
ARTERIAL PATENCY WRIST A: POSITIVE
AST SERPL W P-5'-P-CCNC: 15 IU/L (ref 10–42)
BASE EXCESS BLDMV CALC-SCNC: 4.4 MMOL/L (ref -2–3)
BASE EXCESS BLDMV CALC-SCNC: 5.1 MMOL/L (ref -2–3)
BASE EXCESS BLDMV CALC-SCNC: 5.3 MMOL/L (ref -2–3)
BASOPHILS NFR BLD AUTO: 0 10^3/UL (ref 0–0.1)
BASOPHILS NFR BLD AUTO: 0.7 %
BILIRUB BLD-MCNC: 0.5 MG/DL (ref 0.2–1)
BUN SERPL-MCNC: 29 MG/DL (ref 6–20)
CALCIUM UR-MCNC: 8.4 MG/DL (ref 8.5–10.3)
CHLORIDE SERPL-SCNC: 102 MMOL/L (ref 101–111)
CO2 BLDA CALC-SCNC: 34.3 MMOL/L (ref 21–29)
CO2 BLDA CALC-SCNC: 36 MMOL/L (ref 21–29)
CO2 BLDA CALC-SCNC: 36.4 MMOL/L (ref 21–29)
CO2 SERPL-SCNC: 31 MMOL/L (ref 21–32)
CREAT SERPLBLD-SCNC: 1.7 MG/DL (ref 0.4–1)
DEPRECATED HCO3 PLAS-SCNC: 32.4 MMOL/L (ref 22–26)
DEPRECATED HCO3 PLAS-SCNC: 33.6 MMOL/L (ref 22–26)
DEPRECATED HCO3 PLAS-SCNC: 34.1 MMOL/L (ref 22–26)
EOSINOPHIL # BLD AUTO: 0.2 10^3/UL (ref 0–0.7)
EOSINOPHIL NFR BLD AUTO: 3.1 %
ERYTHROCYTE [DISTWIDTH] IN BLOOD BY AUTOMATED COUNT: 19.3 % (ref 12–15)
GFRSERPLBLD MDRD-ARVRAT: 32 ML/MIN/{1.73_M2} (ref 89–?)
GLOBULIN SER-MCNC: 3.5 G/DL (ref 2.1–4.2)
GLUCOSE SERPL-MCNC: 133 MG/DL (ref 70–100)
HGB UR QL STRIP: 11.7 G/DL (ref 12–16)
LYMPHOCYTES # SPEC AUTO: 0.9 10^3/UL (ref 1.5–3.5)
LYMPHOCYTES NFR BLD AUTO: 13.4 %
MAGNESIUM SERPL-MCNC: 1.5 MG/DL (ref 1.7–2.8)
MCH RBC QN AUTO: 22.9 PG (ref 27–31)
MCHC RBC AUTO-ENTMCNC: 30 G/DL (ref 32–36)
MCV RBC AUTO: 76.4 FL (ref 81–99)
MONOCYTES # BLD AUTO: 0.7 10^3/UL (ref 0–1)
MONOCYTES NFR BLD AUTO: 10.3 %
NEUTROPHILS # BLD AUTO: 4.7 10^3/UL (ref 1.5–6.6)
NEUTROPHILS # SNV AUTO: 6.4 X10^3/UL (ref 4.8–10.8)
NEUTROPHILS NFR BLD AUTO: 72.5 %
PCO2 TEMP ADJ BLDCOA: 59 MMHG (ref 34–45)
PCO2 TEMP ADJ BLDCOA: 75 MMHG (ref 34–45)
PCO2 TEMP ADJ BLDCOA: 75 MMHG (ref 34–45)
PDW BLD AUTO: 10.5 FL (ref 7.9–10.8)
PH TEMP ADJ BLDA: 7.27 [PH] (ref 7.35–7.45)
PH TEMP ADJ BLDA: 7.28 [PH] (ref 7.35–7.45)
PH TEMP ADJ BLDA: 7.36 [PH] (ref 7.35–7.45)
PHOSPHATE BLD-MCNC: 3.9 MG/DL (ref 2.5–4.6)
PLATELET # BLD: 136 10^3/UL (ref 130–450)
PO2 TEMP ADJ BLDCOA: 126 MMHG (ref 80–100)
PO2 TEMP ADJ BLDCOA: 46 MMHG (ref 80–100)
PO2 TEMP ADJ BLDCOA: 63 MMHG (ref 80–100)
PROT SPEC-MCNC: 5.7 G/DL (ref 6.7–8.2)
RBC MAR: 5.08 10^6/UL (ref 4.2–5.4)
SAO2 % BLDA FROM PO2: 85 % (ref 94–98)
SAO2 % BLDA FROM PO2: 93 % (ref 94–98)
SAO2 % BLDA FROM PO2: 98 % (ref 94–98)
SODIUM SERPLBLD-SCNC: 142 MMOL/L (ref 135–145)

## 2018-01-05 PROCEDURE — 5A1935Z RESPIRATORY VENTILATION, LESS THAN 24 CONSECUTIVE HOURS: ICD-10-PCS | Performed by: NURSE ANESTHETIST, CERTIFIED REGISTERED

## 2018-01-05 PROCEDURE — 0BH17EZ INSERTION OF ENDOTRACHEAL AIRWAY INTO TRACHEA, VIA NATURAL OR ARTIFICIAL OPENING: ICD-10-PCS | Performed by: NURSE ANESTHETIST, CERTIFIED REGISTERED

## 2018-01-05 RX ADMIN — METHYLPREDNISOLONE SODIUM SUCCINATE SCH MG: 40 INJECTION, POWDER, FOR SOLUTION INTRAMUSCULAR; INTRAVENOUS at 10:03

## 2018-01-05 RX ADMIN — IPRATROPIUM BROMIDE AND ALBUTEROL SULFATE SCH ML: 2.5; .5 SOLUTION RESPIRATORY (INHALATION) at 11:01

## 2018-01-05 RX ADMIN — FUROSEMIDE SCH MG: 10 INJECTION, SOLUTION INTRAVENOUS at 06:27

## 2018-01-05 RX ADMIN — MORPHINE SULFATE PRN MG: 2 INJECTION, SOLUTION INTRAMUSCULAR; INTRAVENOUS at 01:51

## 2018-01-05 RX ADMIN — ENOXAPARIN SODIUM SCH MG: 100 INJECTION SUBCUTANEOUS at 09:44

## 2018-01-05 RX ADMIN — FUROSEMIDE SCH MG: 10 INJECTION, SOLUTION INTRAVENOUS at 14:25

## 2018-01-05 RX ADMIN — SODIUM CHLORIDE, PRESERVATIVE FREE PRN ML: 5 INJECTION INTRAVENOUS at 11:23

## 2018-01-05 RX ADMIN — FAMOTIDINE SCH MLS/HR: 20 INJECTION, SOLUTION INTRAVENOUS at 09:42

## 2018-01-05 RX ADMIN — SODIUM CHLORIDE, PRESERVATIVE FREE SCH ML: 5 INJECTION INTRAVENOUS at 06:27

## 2018-01-05 RX ADMIN — METHYLPREDNISOLONE SODIUM SUCCINATE SCH MG: 40 INJECTION, POWDER, FOR SOLUTION INTRAMUSCULAR; INTRAVENOUS at 15:12

## 2018-01-05 RX ADMIN — IPRATROPIUM BROMIDE AND ALBUTEROL SULFATE SCH ML: 2.5; .5 SOLUTION RESPIRATORY (INHALATION) at 07:20

## 2018-01-05 RX ADMIN — SODIUM CHLORIDE, PRESERVATIVE FREE SCH ML: 5 INJECTION INTRAVENOUS at 14:25

## 2018-01-05 RX ADMIN — MORPHINE SULFATE PRN MG: 2 INJECTION, SOLUTION INTRAMUSCULAR; INTRAVENOUS at 14:40

## 2018-01-05 NOTE — ANESTHESIA PROCEDURE NOTE
Anesthesia Intubation Template





- Intubation


Blade: positive: Glidescope


Tube: Size-enter number (7.5, cuffed, 22 cm at teeth, oral, positive end tidal 

CO2 and bilateral euql breath sounds, no complications. Placed on ventilator by 

RT, tidal volume 500, rate 20, Peep 7, 100 % O2), Cuffed

## 2018-01-05 NOTE — XRAY REPORT
DATE OF SERVICE:

 

PORTABLE CHEST:   01/05/2018 at approximately 1456.

 

COMPARISON: Frontal portable chest 01/05/2018 at approximately 0854.

 

INDICATION:  Patient intubated now.

 

TECHNIQUE:  Frontal portable chest view.

 

FINDINGS:

Endotracheal tube tip just below the level of the clavicles, appears 
appropriate.  A right upper extremity 

PICC overlies the superior vena cava with the tip not well seen due to body 
habitus.

 

Again seen is dense airspace opacity of both lungs with near complete whiteout 
of the thoraces.  No obvious 

pneumothorax.  Cannot exclude effusions.  Mediastinum not evaluated due to 
adjacent opacity.

 

IMPRESSION:

1.  APPROPRIATE APPEARANCE OF LINES AND TUBES.

 

2.  NEAR WHITEOUT OF BILATERAL THORACES.  APPARENT DIFFUSE AIRSPACE DISEASE.

 

3.  DIFFERENTIAL CONSIDERATIONS REMAIN INFECTION OR EDEMA, OR POSSIBLY ARDS.  
CORRELATE CLINICALLY.  FOLLOWUP 

IS AVAILABLE.

 

 

 

DD: 01/05/2018 15:13

TD: 01/05/2018 17:05

Job #: [id number]

MTDD

## 2018-01-05 NOTE — DISCHARGE SUMMARY
Discharge Summary


Admit Date: 01/01/18


Discharge Date: 01/05/18 (Accepting physician Courtney Fu MD)


Discharging Provider: Idris Bhatti MD


Primary Care Provider: Lindsay Sosa


Code Status: Attempt Resuscitation


Condition at Discharge: Good


Discharge Disposition: 02 Transfer Acute Care Hosp


Discharge Facility Name: [facility] - Accepting physician 

Courtney Fu MD





- DIAGNOSES


Admission Diagnoses: 





1.  Type 2 diabetes


2.  Hyperlipidemia


3.  Essential hypertension


4.  Morbid obesity


5.  Anasarca


Discharge Diagnoses with Status of Each Condition: 





1.  Acute respiratory failure with hypoxia and hypercapnia: Critical condition


2.  Acute respiratory distress syndrome: Critical condition


3.  CHF exacerbation: Critical condition


4.  Morbid obesity: Critical condition


5.  Obesity hypoventilation syndrome: Critical condition


6.  Acute kidney injury: Critical condition


7.  Anasarca: Critical condition


8.  Severe protein malnutrition: Critical condition


9.  Persistent hypoglycemia: Critical condition


10.  Type 2 diabetes: Critical condition








- HPI


History of Present Illness: 





Fanny Flores is a morbidly obese 52-year old white female with a history of 

super obesity with a current BMI of 76.7, diabetes mellitus type 2, necrotizing 

fascitis to inner thigh, hypertension, hyperlipidemia, migraines, peripheral 

neuropathy, GERD, anxiety, chronic back pain, cardiac stress test, and CHF. The 

patient came to the ED with progressive shortness of breath, increased edema, 

headaches and weakness.  She admits to having bronchitis about a week ago  Once 

arriving in the ED the patient was found to be very hypoxic with oxygen 

saturations in the 80's.  Patient will be admitted for aggressive diuresis, 

dugan catheter for skin protection, blood sugar control and further work up.











- HOSPITAL COURSE


Hospital Course: 





Patient is a 52-year-old female with a past medical history of superobesity 

with a current BMI of 76.7, type 2 diabetes, history of necrotizing fasciitis 

of her inner thigh, hypertension, hyperlipidemia, migraines, peripheral 

neuropathy, GERD, anxiety, chronic back pain, congestive heart failure and 

noncompliance with medical treatment who presented to the emergency department 

with a chief complaint of progressively worsening shortness of air.  The 

patient stated that she had had bronchitis a few weeks earlier and since then 

had been declining slowly with progressively worsening shortness of breath to a 

point where she was no longer even able to ambulate or exert herself minimally 

without becoming very short of breath.  The patient has also had over 50 pounds 

of weight gain over the last 6 months.  The patient admits to increasing lower 

extremity and body swelling.  The patient on presentation to the emergency 

department was very hypoxic with oxygen saturation in the 80s.  The patient was 

thought to be fluid overloaded and admitted for aggressive diuresis.





After admission to the hospital the patient had an acute decline with 

increasing lethargy and eventually was found to have hypercapnic and hypoxemic 

respiratory failure requiring BiPAP.  The patient was transferred to our 

intensive care unit.  She was continued on BiPAP for several days without any 

significant improvement.  The patient's ABGs continued to worsen with 

increasing hypercapnia and acidosis.  The patient's CO2 increased to 75 with a 

pH of 7.2 and she was not showing any signs of improvement therefore she was 

eventually intubated and then transferred to [facility] in [place].





The cause of the patient's respiratory failure appears to be multifactorial 

secondary to fluid overload from diastolic heart failure and likely pulmonary 

hypertension from obesity hypoventilation syndrome and likely struck to sleep 

apnea.  The patient was in ARDS at the time of discharge by criteria as she had 

complete white out of bilateral lungs on chest x-ray and had PF ratio of 63.  

The patient needed a higher level of care with a pulmonologist to take care of 

the ventilator and therefore was transferred.





The patient was also persistently hypoglycemic for the last several days in the 

hospital and was requiring D5 drip.  This seemed to be very unusual and the 

exact etiology is unknown.  It is possible that this could have been secondary 

to sepsis.





The patient was on IV antibiotics with vancomycin and cefepime which was 

started on the day of transfer as she had worsening chest x-ray with concern 

for possible consolidations.  The patient also was on duo nebs as well as IV 

steroids for treatment of COPD.  The patient was also on Lasix 120 mg 3 times 

daily which her blood pressure was tolerating well for CHF exacerbation.  The 

patient was negative nearly 10 L at the time of transfer but had very minimal 

improvement in her oxygenation and hypercapnia.  The patient was intubated at 

the time of transfer.





Long discussions were had with the patient's family including her  about 

the patient's poor prognosis and possibility that she may not be able to come 

off of the ventilator.  After much discussion the family did want the patient 

to be intubated and did want her to remain a full code.  The family believes 

that the patient will learn her lesson and start being compliant with 

medication if she gets through this hospitalization.





The patient also had acute kidney injury which was likely the result of 

cardiorenal syndrome from CHF and she will continue diuresis.





The patient could not undergo CT angiogram to look for a pulmonary embolism 

secondary to her kidney function but this is still on the differential for the 

patient's acute respiratory failure.  The patient is being treated with Lovenox 

100 mg twice daily.





The patient was transferred to [facility] in very critical 

condition and her prognosis is poor.  It is felt however that the patient will 

benefit from a higher level of care and specialty care.





Patient did undergo echocardiogram which showed normal ejection fraction with 

difficult to visualize valves and right heart making it difficult to assess for 

diastolic heart failure or pulmonary hypertension.





- ALLERGIES


Allergies/Adverse Reactions: 


 Allergies











Allergy/AdvReac Type Severity Reaction Status Date / Time


 


gabapentin AdvReac  Unknown Verified 01/01/18 15:40


 


metformin AdvReac  Nausea Verified 01/01/18 15:40














- MEDICATIONS


Home Medications: 


 Ambulatory Orders











 Medication  Instructions  Recorded  Confirmed


 


Fluticasone/Salmeterol [Advair 1 puffs INH BID 06/01/17 01/01/18





250-50 Diskus]   


 


Polyethylene Glycol 3350 17 gm PO DAILY PRN 06/01/17 01/01/18


 


Simvastatin 20 mg PO QPM 06/01/17 01/02/18


 


Aspirin [Aspir-Low] 81 mg PO DAILY 06/06/17 01/01/18


 


Esomeprazole Magnesium [Nexium] 40 mg PO BIDAC 06/06/17 01/02/18


 


Furosemide [Furosemide] 80 mg PO DAILY 06/06/17 01/02/18


 


Insulin Aspart [NovoLOG] 35 units SUBQ AC 06/06/17 01/01/18


 


Insulin Glargine [Lantus Solostar] 25 units SUBQ QPM 06/06/17 01/02/18


 


Ipratropium/Albuterol [Combivent 1 puffs INH QID 06/06/17 01/02/18





Respimat]   


 


Levothyroxine Sodium [Synthroid] 50 mcg PO QDAC 06/06/17 01/02/18


 


Lisinopril [Lisinopril] 10 mg PO DAILY 06/06/17 01/02/18


 


Metoprolol Succinate [Toprol Xl] 25 mg PO DAILY 06/06/17 01/02/18


 


Nortriptyline HCl [Nortriptyline 75 mg PO QPM 06/06/17 01/02/18





HCl]   


 


Potassium Chloride [K-Tab ER] 30 meq PO DAILYWM 06/06/17 01/02/18


 


hydrOXYzine pamoate [Hydroxyzine 25 mg PO QPM PRN 01/01/18 01/02/18





Pamoate]   


 


Insulin Aspart [Novolog Flexpen] 8 units SUBQ TIDWM 01/02/18 01/02/18














- PHYSICAL EXAM AT DISCHARGE


General Appearance: positive: Severe distress (Intubated and sedated), Other (

Morbidly obese)


Eyes Bilateral: positive: Normal inspection, PERRL, EOMI, No lid inflammation, 

Conjunctivae nml, No scleral icterus


ENT: positive: ENT inspection nml, Pharynx nml, No signs of dehydration.  

negative: Purulent nasal drainage, Pharyngeal erythema, Oral lesions


Neck: positive: Nml inspection, Thyroid nml, Trachea midline.  negative: 

Thyromegaly, Lymphadenopathy (R), Lymphadenopathy (L), Carotid bruit, Tracheal 

deviation


Respiratory: positive: Other (Decreased in all fields)


Cardiovascular: positive: Other (Difficult to auscultate secondary to patient's 

body habitus)


Peripheral Pulses: positive: 2+


Abdomen: positive: Non-tender, No organomegaly, Nml bowel sounds.  negative: 

Guarding, Rebound, Hepatomegaly


Back: positive: Nml inspection.  negative: CVA tenderness (R), CVA tenderness (L

)


Skin: positive: Color nml, Other (Bilateral lower extremity wounds with weeping 

and edema).  negative: Cyanosis, Diaphoresis


Extremities: positive: Pedal edema (4+), Other (Anasarca)


Neurologic/Psychiatric: positive: Other (Patient intubated and sedated)





- LABS


Result Diagrams: 


 01/05/18 04:10





 01/05/18 04:10


Other Lab Results: 








 Laboratory Last Values











WBC  6.4 x10^3/uL (4.8-10.8)   01/05/18  04:10    


 


RBC  5.08 10^6/uL (4.20-5.40)   01/05/18  04:10    


 


Hgb  11.7 g/dL (12.0-16.0)  L  01/05/18  04:10    


 


Hct  38.9 % (37.0-47.0)   01/05/18  04:10    


 


MCV  76.4 fL (81.0-99.0)  L  01/05/18  04:10    


 


MCH  22.9 pg (27.0-31.0)  L  01/05/18  04:10    


 


MCHC  30.0 g/dL (32.0-36.0)  L  01/05/18  04:10    


 


RDW  19.3 % (12.0-15.0)  H  01/05/18  04:10    


 


Plt Count  136 10^3/uL (130-450)   01/05/18  04:10    


 


MPV  10.5 fL (7.9-10.8)   01/05/18  04:10    


 


Neut #  4.7 10^3/uL (1.5-6.6)   01/05/18  04:10    


 


Lymph #  0.9 10^3/uL (1.5-3.5)  L  01/05/18  04:10    


 


Mono #  0.7 10^3/uL (0.0-1.0)   01/05/18  04:10    


 


Eos #  0.2 10^3/uL (0.0-0.7)   01/05/18  04:10    


 


Baso #  0.0 10^3/uL (0.0-0.1)   01/05/18  04:10    


 


Absolute Nucleated RBC  0.01 x10^3/uL  01/05/18  04:10    


 


Nucleated RBC %  0.1 /100WBC  01/05/18  04:10    


 


Manual Slide Review  Indicated   01/02/18  04:45    


 


Platelet Estimate  NORMAL (130-450,000)  (NORMAL)   01/02/18  04:45    


 


Platelet Morphology  1+ LARGE PLATELETS  (NORMAL)   01/02/18  04:45    


 


RBC Morph Micro Appear  2+  ANISOCYTOSIS  (NORMAL) 1+ HYPOCHROMASIA  (NORMAL) 1

+ POLYCHROMASIA  (NORMAL) 1+ TARGET CELLS  (NORMAL)   01/02/18  04:45    


 


RBC Morph Micro Appear  2+  ANISOCYTOSIS  (NORMAL) 1+ HYPOCHROMASIA  (NORMAL) 1

+ POLYCHROMASIA  (NORMAL) 1+ TARGET CELLS  (NORMAL)   01/02/18  04:45    


 


RBC Morph Micro Appear  2+  ANISOCYTOSIS  (NORMAL) 1+ HYPOCHROMASIA  (NORMAL) 1

+ POLYCHROMASIA  (NORMAL) 1+ TARGET CELLS  (NORMAL)   01/02/18  04:45    


 


RBC Morph Micro Appear  2+  ANISOCYTOSIS  (NORMAL) 1+ HYPOCHROMASIA  (NORMAL) 1

+ POLYCHROMASIA  (NORMAL) 1+ TARGET CELLS  (NORMAL)   01/02/18  04:45    


 


Bld Gas Analysis Time  1514   01/05/18  15:04    


 


Sample Site     01/05/18  15:04    


 


ABG pH  7.36  (7.35-7.45)   01/05/18  15:04    


 


ABG pCO2  59 mmHg (34-45)  H  01/05/18  15:04    


 


ABG pO2  63 mmHg ()  L  01/05/18  15:04    


 


ABG HCO3  32.4 mmol/L (22.0-26.0)  H  01/05/18  15:04    


 


ABG Total CO2  34.3 MMOL/L (21.0-29.0)  H  01/05/18  15:04    


 


ABG O2 Saturation  93 % (94-98)  L  01/05/18  15:04    


 


ABG Oximetry Spot Check  92 %  01/05/18  15:04    


 


ABG Base Excess  5.3 mmol/L (-2.0-3.0)  H  01/05/18  15:04    


 


Rufus Test  POSITIVE   01/05/18  15:04    


 


Respiration Rate  22 b/min  01/05/18  15:04    


 


Room Air  NO   01/05/18  15:04    


 


O2 Delivery Device  VENTILATOR   01/05/18  15:04    


 


Vent Mode  ACCESSED/CONTROL   01/05/18  15:04    


 


FiO2  100.00   01/05/18  15:04    


 


Tidal Volume  500 mL  01/05/18  15:04    


 


PEEP  7 cmH2O  01/05/18  15:04    


 


EPAP  5 cmH2O  01/05/18  12:23    


 


IPAP  16 cmH2O  01/05/18  12:23    


 


Sodium  142 mmol/L (135-145)   01/05/18  04:10    


 


Potassium  3.5 mmol/L (3.5-5.0)   01/05/18  04:10    


 


Chloride  102 mmol/L (101-111)   01/05/18  04:10    


 


Carbon Dioxide  31 mmol/L (21-32)   01/05/18  04:10    


 


Anion Gap  9.0  (6-13)   01/05/18  04:10    


 


BUN  29 mg/dL (6-20)  H  01/05/18  04:10    


 


Creatinine  1.7 mg/dL (0.4-1.0)  H  01/05/18  04:10    


 


Estimated GFR (MDRD)  32  (>89)  L  01/05/18  04:10    


 


Glucose  133 mg/dL ()  H  01/05/18  04:10    


 


POC Whole Bld Glucose  119 mg/dL (70 - 100)  H  01/05/18  12:02    


 


Glycated Hemoglobin  10.9 % (4.6-6.2)  H  01/02/18  04:45    


 


Estim Average Glucose  266  ()  H  01/02/18  04:45    


 


Lactic Acid  0.7 mmol/L (0.5-2.2)   01/01/18  16:00    


 


Calcium  8.4 mg/dL (8.5-10.3)  L  01/05/18  04:10    


 


Ionized Calcium  NO   01/05/18  04:10    


 


Phosphorus  3.9 mg/dL (2.5-4.6)   01/05/18  04:10    


 


Magnesium  1.5 mg/dL (1.7-2.8)  L  01/05/18  04:10    


 


Total Bilirubin  0.5 mg/dL (0.2-1.0)   01/05/18  04:10    


 


AST  15 IU/L (10-42)   01/05/18  04:10    


 


ALT  13 IU/L (10-60)   01/05/18  04:10    


 


Alkaline Phosphatase  74 IU/L ()   01/05/18  04:10    


 


Troponin I  < 0.04 ng/mL (<0.49)   01/01/18  16:00    


 


B-Natriuretic Peptide  481 pg/mL (5-100)  H  01/05/18  04:10    


 


Total Protein  5.7 g/dL (6.7-8.2)  L  01/05/18  04:10    


 


Albumin  2.2 g/dL (3.2-5.5)  L  01/05/18  04:10    


 


Globulin  3.5 g/dL (2.1-4.2)   01/05/18  04:10    


 


Albumin/Globulin Ratio  0.6  (1.0-2.2)  L  01/05/18  04:10    


 


Urine Color  YELLOW   01/01/18  21:20    


 


Urine Clarity  HAZY  (CLEAR)   01/01/18  21:20    


 


Urine pH  6.0 PH (5.0-7.5)   01/01/18  21:20    


 


Ur Specific Gravity  1.025  (1.002-1.030)   01/01/18  21:20    


 


Urine Protein  >=300 mg/dL (NEGATIVE)  H  01/01/18  21:20    


 


Urine Glucose (UA)  250 mg/dL (NEGATIVE)  H  01/01/18  21:20    


 


Urine Ketones  NEGATIVE mg/dL (NEGATIVE)   01/01/18  21:20    


 


Urine Occult Blood  MODERATE  (NEGATIVE)  H  01/01/18  21:20    


 


Urine Nitrite  NEGATIVE  (NEGATIVE)   01/01/18  21:20    


 


Urine Bilirubin  NEGATIVE  (NEGATIVE)   01/01/18  21:20    


 


Urine Urobilinogen  0.2 (NORMAL) E.U./dL (NORMAL)   01/01/18  21:20    


 


Ur Leukocyte Esterase  NEGATIVE  (NEGATIVE)   01/01/18  21:20    


 


Urine RBC  0-5 /HPF (0-5)   01/01/18  21:20    


 


Urine WBC  0-3 /HPF (0-5)   01/01/18  21:20    


 


Ur Squamous Epith Cells  NONE SEEN  (<= Few)   01/01/18  21:20    


 


Amorphous Sediment  Moderate /LPF  01/01/18  21:20    


 


Urine Bacteria  None Seen /HPF (None Seen)   01/01/18  21:20    


 


Urine Casts  0-2 Fine Granular /LPF3-5 Hyaline Casts /LPF  01/01/18  21:20    


 


Urine Casts  0-2 Fine Granular /LPF3-5 Hyaline Casts /LPF  01/01/18  21:20    


 


Ur Microscopic Review  INDICATED   01/01/18  21:20    


 


Urine Culture Comments  NOT INDICATED   01/01/18  21:20    


 


Influenza A (Rapid)  Negative  (Negative)   01/01/18  16:00    


 


Influenza B (Rapid)  Negative  (Negative)   01/01/18  16:00    


 


Influenza Types A,B Ag  -   01/01/18  16:00    














- DIAGNOSTIC IMAGING


Diagnostic Imaging Results: Final report reviewed


Diagnostic Imaging Results Comments: 





Chest x-ray at discharge


1.  Appropriate appearance of lines and tubes


2.  Near white out of bilateral thoraces.  Apparent diffuse airspace disease


3.  Differential consideration remain infection or edema, or possible ARDS.





Echocardiogram


1.  Severe concentric left ventricular hypertrophy


2.  Overall left ventricular systolic function is lower limits of normal with 

an ejection fraction of 50-55%


3.  There is a large sized left pleural effusion noted


Very poor image quality





- FOLLOW UP


Follow Up: 





Patient transferred to [facility] for higher level of care





- TIME SPENT


Time Spent in Discharge (Minutes): 75 (FAX TO PCP)

## 2018-01-05 NOTE — XRAY REPORT
DATE OF SERVICE:

 

PORTABLE CHEST:  01/04/2018

 

COMPARISON:  Portable chest earlier in the day.

 

INDICATION:  Check for line placement.  Right upper extremity PICC.

 

TECHNIQUE:  Portable frontal chest.

 

FINDINGS:  Right upper extremity PICC, tip appears to overlie superior vena cava
, but is not definitively 

visualized due to body habitus.

 

Unchanged bilateral lung opacities with whiteout of the left hemithorax.  No 
change.  No obvious pneumothorax.

 

IMPRESSION:  Right upper extremity PICC appears appropriately placed.  The tip 
is difficult to definitively 

visualize due to habitus.

 

 

DD: 01/04/2018 14:28

TD: 01/04/2018 20:47

Job #: [id number]

MTDD

## 2018-01-05 NOTE — CONSULTATION NOTE
Referring Provider


Name of Referring Provider:: Magy


Consult Date: 18 (Called to intubate super morbid obese female in 

respiratory failure, congestive heart failure, obtunded.Preoxygenated with 100% 

for 5 min.  Induced with 100 mg lidocaine, 130 mg propofol, 120 mg 

succinylcholine. Direct visualization times 1 using Glidescope, 7.5 ETT placed, 

atraumatic, positive fend tidal CO2 and BEBS.  Placed on vent by respiratory 

therapy, additional propofol given for sedation, paralyzed with 10 mg 

vecuronium.)





History





- Past Medical History


Cardiovascular: reports: Hypertension, High cholesterol, Angina (history of a 

cardiac stress test.)


Respiratory: reports: Asthma, Shortness of breath


Neuro: reports: Headache/migraine, Peripheral neuropathy


Endocrine/Autoimmune: reports: Type 2 diabetes


GI: reports: GERD


GYN: reports: None


: reports: None, Incontinence, Frequency


HEENT: reports: None


Psych: reports: Anxiety


Musculoskeletal: reports: Chronic back pain


Derm: reports: None


MRSA Hx?: Yes





- Past Surgical History


General: reports: Appendectomy


Cardiovascular: reports: Angioplasty





- Family & Social History


Family History: Mother: , Diabetes, Type 2, Mental Illness, Father: 

, Diabetes, Type 2, Sister: Alive and Well, , Diabetes, Type 2


Living arrangement: At home


Living Situation: With spouse/s.o., With family (A zay and her 6-year old 

child now lives in her home.)


Social History Notes: Patient lives independently with , zay, and zay

's son.  [place] 

 is retired from the [employer].  She has a cat named Rita De Dios.





- POLST


Patient has POLST: No


POLST Status: Full Code





Meds/Allgy





- Home Medications


Home Medications: 


 Ambulatory Orders











 Medication  Instructions  Recorded  Confirmed


 


Fluticasone/Salmeterol [Advair 1 puffs INH BID 17





250-50 Diskus]   


 


Polyethylene Glycol 3350 17 gm PO DAILY PRN 17


 


Simvastatin 20 mg PO QPM 17


 


Aspirin [Aspir-Low] 81 mg PO DAILY 17


 


Esomeprazole Magnesium [Nexium] 40 mg PO BIDAC 17


 


Furosemide [Furosemide] 80 mg PO DAILY 17


 


Insulin Aspart [NovoLOG] 35 units SUBQ AC 17


 


Insulin Glargine [Lantus Solostar] 25 units SUBQ QPM 17


 


Ipratropium/Albuterol [Combivent 1 puffs INH QID 17





Respimat]   


 


Levothyroxine Sodium [Synthroid] 50 mcg PO QDAC 17


 


Lisinopril [Lisinopril] 10 mg PO DAILY 17


 


Metoprolol Succinate [Toprol Xl] 25 mg PO DAILY 17


 


Nortriptyline HCl [Nortriptyline 75 mg PO QPM 17





HCl]   


 


Potassium Chloride [K-Tab ER] 30 meq PO DAILYWM 17


 


hydrOXYzine pamoate [Hydroxyzine 25 mg PO QPM PRN 18





Pamoate]   


 


Insulin Aspart [Novolog Flexpen] 8 units SUBQ TIDWM 18














- Allergies


Allergies/Adverse Reactions: 


 Allergies











Allergy/AdvReac Type Severity Reaction Status Date / Time


 


gabapentin AdvReac  Unknown Verified 18 15:40


 


metformin AdvReac  Nausea Verified 18 15:40














Exam





- Vital Signs


Vital Signs: 





 Vital Signs x48h











  Temp Pulse Pulse Resp BP BP Pulse Ox


 


 18 13:18    78  18   187/118 H  99


 


 18 12:18      175/93 H  


 


 18 12:06    85  17   175/93 H  100


 


 18 11:24    85  16   178/98 H  99


 


 18 11:04   82   18   


 


 18 10:05    86  19   180/104 H  100


 


 18 09:29   86     


 


 18 09:00    86  18   149/104 H  100


 


 18 08:00  36.8 C   84  18   160/79 H 


 


 18 07:23   81     


 


 18 07:15   80   18   


 


 18 07:00    80  18   166/92 H  100


 


 18 06:34      173/98 H  


 


 18 06:00  37.1 C   95  18   173/98 H  99


 


 18 05:51   95     














Conclusion/Plan





- Lab Results


Lab results reviewed: Yes


Fish Bones: 


 18 04:10





 18 04:10

## 2018-01-05 NOTE — XRAY REPORT
DATE OF SERVICE:

 

PORTABLE FRONTAL CHEST:  01/05/2018

 

COMPARISON:  Frontal chest 01/04/2018.

 

INDICATION:  Worsening hypoxia and respiratory failure.

 

TECHNIQUE:  Frontal portable chest.

 

FINDINGS

Right upper extremity PICC,  tip overlies the superior vena cava.

 

Overall, worsening appearance of the right lung and somewhat improved 
appearance of the left lung.  There is 

now, however, diffuse airspace disease of both lungs, likely a cause of hypoxia
, dyspnea or respiratory 

failure.

 

There may be pleural effusions.  The lung bases are incompletely evaluated on 
this image.  No definite 

pneumothorax.  Mediastinum appears unchanged, but is poorly evaluated given 
adjacent opacity.

 

IMPRESSION:   DIFFUSE BILATERAL PULMONARY AIRSPACE DISEASE. FINDINGS MAY BE ON 
THE BASIS OF ARDS, OR 

ALTERNATIVELY INFECTION AND/OR EDEMA. CORRELATE CLINICALLY.  FOLLOWUP IS 
RECOMMENDED.

 

 

 

DD: 01/05/2018 09:16

TD: 01/05/2018 10:59

Job #: [id number]

MTDD

## 2018-01-16 NOTE — XRAY REPORT
DATE OF SERVICE: 01/04/2018

 

FRONTAL CHEST:  01/04/2018

 

CLINICAL INDICATION:  PICC placement.

 

Film was returned on 01/16/2018 for dictation.

 

Frontal view of the chest demonstrates a right arm PICC terminating at the junction between the

right subclavian vein and the superior vena cava.  There is complete opacification of the left 

hemithorax.  A small right effusion is noted.

 

IMPRESSION:  Right arm PICC terminating at the junction between the right subclavian vein and 

superior vena cava.  Complete collapse of the left lung.  Small right effusion.

 

 

DD: 01/16/2018 14:29

TD: 01/16/2018 20:36

Job #: [id number]